# Patient Record
Sex: FEMALE | Race: WHITE | NOT HISPANIC OR LATINO | Employment: FULL TIME | URBAN - METROPOLITAN AREA
[De-identification: names, ages, dates, MRNs, and addresses within clinical notes are randomized per-mention and may not be internally consistent; named-entity substitution may affect disease eponyms.]

---

## 2017-03-09 ENCOUNTER — RECORDS - HEALTHEAST (OUTPATIENT)
Dept: GENERAL RADIOLOGY | Facility: CLINIC | Age: 62
End: 2017-03-09

## 2017-03-09 ENCOUNTER — OFFICE VISIT - HEALTHEAST (OUTPATIENT)
Dept: FAMILY MEDICINE | Facility: CLINIC | Age: 62
End: 2017-03-09

## 2017-03-09 DIAGNOSIS — J18.9 PNEUMONIA OF RIGHT LOWER LOBE DUE TO INFECTIOUS ORGANISM: ICD-10-CM

## 2017-03-09 DIAGNOSIS — R05.9 COUGH: ICD-10-CM

## 2017-03-09 DIAGNOSIS — R25.2 CRAMP OF BOTH LOWER EXTREMITIES: ICD-10-CM

## 2017-03-09 ASSESSMENT — MIFFLIN-ST. JEOR: SCORE: 1679.18

## 2017-06-22 ENCOUNTER — OFFICE VISIT - HEALTHEAST (OUTPATIENT)
Dept: FAMILY MEDICINE | Facility: CLINIC | Age: 62
End: 2017-06-22

## 2017-06-22 DIAGNOSIS — I65.23 CAROTID ARTERY PLAQUE, BILATERAL: ICD-10-CM

## 2017-06-22 DIAGNOSIS — E78.00 PURE HYPERCHOLESTEROLEMIA: ICD-10-CM

## 2017-06-29 ENCOUNTER — HOSPITAL ENCOUNTER (OUTPATIENT)
Dept: ULTRASOUND IMAGING | Facility: HOSPITAL | Age: 62
Discharge: HOME | End: 2017-06-29
Attending: FAMILY MEDICINE

## 2017-06-29 ENCOUNTER — COMMUNICATION - HEALTHEAST (OUTPATIENT)
Dept: FAMILY MEDICINE | Facility: CLINIC | Age: 62
End: 2017-06-29

## 2017-06-29 DIAGNOSIS — I65.23 CAROTID ARTERY PLAQUE, BILATERAL: ICD-10-CM

## 2017-08-17 ENCOUNTER — RECORDS - HEALTHEAST (OUTPATIENT)
Dept: ADMINISTRATIVE | Facility: OTHER | Age: 62
End: 2017-08-17

## 2017-10-05 ENCOUNTER — OFFICE VISIT - HEALTHEAST (OUTPATIENT)
Dept: FAMILY MEDICINE | Facility: CLINIC | Age: 62
End: 2017-10-05

## 2017-10-05 ENCOUNTER — AMBULATORY - HEALTHEAST (OUTPATIENT)
Dept: FAMILY MEDICINE | Facility: CLINIC | Age: 62
End: 2017-10-05

## 2017-10-05 DIAGNOSIS — J40 BRONCHITIS: ICD-10-CM

## 2017-10-05 RX ORDER — AMOXICILLIN 500 MG/1
TABLET, FILM COATED ORAL
Refills: 3 | Status: SHIPPED | COMMUNITY
Start: 2017-09-25 | End: 2021-07-29

## 2017-10-05 RX ORDER — ASCORBIC ACID 500 MG
500 TABLET ORAL
Status: SHIPPED | COMMUNITY
Start: 2010-06-28

## 2017-10-05 RX ORDER — ASPIRIN 325 MG
325 TABLET ORAL
Status: SHIPPED | COMMUNITY
Start: 2010-06-28 | End: 2021-07-29

## 2017-10-05 ASSESSMENT — MIFFLIN-ST. JEOR: SCORE: 1653.77

## 2017-10-14 ENCOUNTER — COMMUNICATION - HEALTHEAST (OUTPATIENT)
Dept: FAMILY MEDICINE | Facility: CLINIC | Age: 62
End: 2017-10-14

## 2017-10-24 ENCOUNTER — AMBULATORY - HEALTHEAST (OUTPATIENT)
Dept: FAMILY MEDICINE | Facility: CLINIC | Age: 62
End: 2017-10-24

## 2017-10-24 ENCOUNTER — COMMUNICATION - HEALTHEAST (OUTPATIENT)
Dept: FAMILY MEDICINE | Facility: CLINIC | Age: 62
End: 2017-10-24

## 2017-11-22 ENCOUNTER — COMMUNICATION - HEALTHEAST (OUTPATIENT)
Dept: FAMILY MEDICINE | Facility: CLINIC | Age: 62
End: 2017-11-22

## 2017-12-06 ENCOUNTER — COMMUNICATION - HEALTHEAST (OUTPATIENT)
Dept: FAMILY MEDICINE | Facility: CLINIC | Age: 62
End: 2017-12-06

## 2017-12-07 ENCOUNTER — AMBULATORY - HEALTHEAST (OUTPATIENT)
Dept: FAMILY MEDICINE | Facility: CLINIC | Age: 62
End: 2017-12-07

## 2018-06-14 ENCOUNTER — RECORDS - HEALTHEAST (OUTPATIENT)
Dept: GENERAL RADIOLOGY | Facility: CLINIC | Age: 63
End: 2018-06-14

## 2018-06-14 ENCOUNTER — AMBULATORY - HEALTHEAST (OUTPATIENT)
Dept: FAMILY MEDICINE | Facility: CLINIC | Age: 63
End: 2018-06-14

## 2018-06-14 ENCOUNTER — OFFICE VISIT - HEALTHEAST (OUTPATIENT)
Dept: FAMILY MEDICINE | Facility: CLINIC | Age: 63
End: 2018-06-14

## 2018-06-14 ENCOUNTER — COMMUNICATION - HEALTHEAST (OUTPATIENT)
Dept: FAMILY MEDICINE | Facility: CLINIC | Age: 63
End: 2018-06-14

## 2018-06-14 DIAGNOSIS — R05.3 CHRONIC COUGH: ICD-10-CM

## 2018-06-14 DIAGNOSIS — Z79.899 MEDICATION MANAGEMENT: ICD-10-CM

## 2018-06-14 DIAGNOSIS — G89.29 CHRONIC PAIN OF LEFT KNEE: ICD-10-CM

## 2018-06-14 DIAGNOSIS — L30.9 ECZEMA, UNSPECIFIED TYPE: ICD-10-CM

## 2018-06-14 DIAGNOSIS — R05.9 COUGH: ICD-10-CM

## 2018-06-14 DIAGNOSIS — E66.9 OBESITY: ICD-10-CM

## 2018-06-14 DIAGNOSIS — Z00.00 ROUTINE GENERAL MEDICAL EXAMINATION AT A HEALTH CARE FACILITY: ICD-10-CM

## 2018-06-14 DIAGNOSIS — M25.562 CHRONIC PAIN OF LEFT KNEE: ICD-10-CM

## 2018-06-14 DIAGNOSIS — E78.00 PURE HYPERCHOLESTEROLEMIA: ICD-10-CM

## 2018-06-14 LAB
ALBUMIN SERPL-MCNC: 4.2 G/DL (ref 3.5–5)
ALP SERPL-CCNC: 99 U/L (ref 45–120)
ALT SERPL W P-5'-P-CCNC: 23 U/L (ref 0–45)
ANION GAP SERPL CALCULATED.3IONS-SCNC: 10 MMOL/L (ref 5–18)
AST SERPL W P-5'-P-CCNC: 19 U/L (ref 0–40)
BILIRUB SERPL-MCNC: 0.7 MG/DL (ref 0–1)
BUN SERPL-MCNC: 29 MG/DL (ref 8–22)
CALCIUM SERPL-MCNC: 9.7 MG/DL (ref 8.5–10.5)
CHLORIDE BLD-SCNC: 107 MMOL/L (ref 98–107)
CHOLEST SERPL-MCNC: 160 MG/DL
CO2 SERPL-SCNC: 27 MMOL/L (ref 22–31)
CREAT SERPL-MCNC: 0.72 MG/DL (ref 0.6–1.1)
FASTING STATUS PATIENT QL REPORTED: YES
GFR SERPL CREATININE-BSD FRML MDRD: >60 ML/MIN/1.73M2
GLUCOSE BLD-MCNC: 89 MG/DL (ref 70–125)
HDLC SERPL-MCNC: 49 MG/DL
HGB BLD-MCNC: 14 G/DL (ref 12–16)
LDLC SERPL CALC-MCNC: 73 MG/DL
POTASSIUM BLD-SCNC: 4.1 MMOL/L (ref 3.5–5)
PROT SERPL-MCNC: 6.9 G/DL (ref 6–8)
SODIUM SERPL-SCNC: 144 MMOL/L (ref 136–145)
TRIGL SERPL-MCNC: 191 MG/DL

## 2018-06-14 ASSESSMENT — MIFFLIN-ST. JEOR: SCORE: 1676.34

## 2018-06-20 ENCOUNTER — COMMUNICATION - HEALTHEAST (OUTPATIENT)
Dept: FAMILY MEDICINE | Facility: CLINIC | Age: 63
End: 2018-06-20

## 2019-02-08 ENCOUNTER — COMMUNICATION - HEALTHEAST (OUTPATIENT)
Dept: FAMILY MEDICINE | Facility: CLINIC | Age: 64
End: 2019-02-08

## 2019-02-11 ENCOUNTER — COMMUNICATION - HEALTHEAST (OUTPATIENT)
Dept: FAMILY MEDICINE | Facility: CLINIC | Age: 64
End: 2019-02-11

## 2019-03-11 ENCOUNTER — COMMUNICATION - HEALTHEAST (OUTPATIENT)
Dept: FAMILY MEDICINE | Facility: CLINIC | Age: 64
End: 2019-03-11

## 2019-04-09 ENCOUNTER — COMMUNICATION - HEALTHEAST (OUTPATIENT)
Dept: FAMILY MEDICINE | Facility: CLINIC | Age: 64
End: 2019-04-09

## 2019-04-09 DIAGNOSIS — L30.9 ECZEMA, UNSPECIFIED TYPE: ICD-10-CM

## 2019-04-15 ENCOUNTER — COMMUNICATION - HEALTHEAST (OUTPATIENT)
Dept: FAMILY MEDICINE | Facility: CLINIC | Age: 64
End: 2019-04-15

## 2019-04-15 DIAGNOSIS — L30.9 ECZEMA, UNSPECIFIED TYPE: ICD-10-CM

## 2019-04-17 ENCOUNTER — COMMUNICATION - HEALTHEAST (OUTPATIENT)
Dept: FAMILY MEDICINE | Facility: CLINIC | Age: 64
End: 2019-04-17

## 2019-06-02 ENCOUNTER — COMMUNICATION - HEALTHEAST (OUTPATIENT)
Dept: FAMILY MEDICINE | Facility: CLINIC | Age: 64
End: 2019-06-02

## 2019-06-02 DIAGNOSIS — E78.00 PURE HYPERCHOLESTEROLEMIA: ICD-10-CM

## 2019-07-11 ENCOUNTER — OFFICE VISIT - HEALTHEAST (OUTPATIENT)
Dept: FAMILY MEDICINE | Facility: CLINIC | Age: 64
End: 2019-07-11

## 2019-07-11 DIAGNOSIS — Z79.899 MEDICATION MANAGEMENT: ICD-10-CM

## 2019-07-11 DIAGNOSIS — E78.00 PURE HYPERCHOLESTEROLEMIA: ICD-10-CM

## 2019-07-11 DIAGNOSIS — L30.9 ECZEMA, UNSPECIFIED TYPE: ICD-10-CM

## 2019-07-11 DIAGNOSIS — E66.01 MORBID OBESITY (H): ICD-10-CM

## 2019-07-11 DIAGNOSIS — Z12.11 SCREEN FOR COLON CANCER: ICD-10-CM

## 2019-07-11 DIAGNOSIS — Z00.00 HEALTH CARE MAINTENANCE: ICD-10-CM

## 2019-07-11 LAB
ALBUMIN SERPL-MCNC: 4.2 G/DL (ref 3.5–5)
ALP SERPL-CCNC: 96 U/L (ref 45–120)
ALT SERPL W P-5'-P-CCNC: 18 U/L (ref 0–45)
ANION GAP SERPL CALCULATED.3IONS-SCNC: 7 MMOL/L (ref 5–18)
AST SERPL W P-5'-P-CCNC: 16 U/L (ref 0–40)
BILIRUB SERPL-MCNC: 0.6 MG/DL (ref 0–1)
BUN SERPL-MCNC: 17 MG/DL (ref 8–22)
CALCIUM SERPL-MCNC: 10 MG/DL (ref 8.5–10.5)
CHLORIDE BLD-SCNC: 105 MMOL/L (ref 98–107)
CHOLEST SERPL-MCNC: 166 MG/DL
CO2 SERPL-SCNC: 30 MMOL/L (ref 22–31)
CREAT SERPL-MCNC: 0.72 MG/DL (ref 0.6–1.1)
FASTING STATUS PATIENT QL REPORTED: YES
GFR SERPL CREATININE-BSD FRML MDRD: >60 ML/MIN/1.73M2
GLUCOSE BLD-MCNC: 88 MG/DL (ref 70–125)
HDLC SERPL-MCNC: 52 MG/DL
LDLC SERPL CALC-MCNC: 75 MG/DL
POTASSIUM BLD-SCNC: 4.2 MMOL/L (ref 3.5–5)
PROT SERPL-MCNC: 6.9 G/DL (ref 6–8)
SODIUM SERPL-SCNC: 142 MMOL/L (ref 136–145)
TRIGL SERPL-MCNC: 195 MG/DL

## 2019-07-11 ASSESSMENT — MIFFLIN-ST. JEOR: SCORE: 1657.74

## 2019-07-14 ENCOUNTER — COMMUNICATION - HEALTHEAST (OUTPATIENT)
Dept: FAMILY MEDICINE | Facility: CLINIC | Age: 64
End: 2019-07-14

## 2019-07-14 DIAGNOSIS — E78.00 PURE HYPERCHOLESTEROLEMIA: ICD-10-CM

## 2019-07-15 ENCOUNTER — COMMUNICATION - HEALTHEAST (OUTPATIENT)
Dept: FAMILY MEDICINE | Facility: CLINIC | Age: 64
End: 2019-07-15

## 2019-09-05 ENCOUNTER — COMMUNICATION - HEALTHEAST (OUTPATIENT)
Dept: FAMILY MEDICINE | Facility: CLINIC | Age: 64
End: 2019-09-05

## 2019-12-20 ENCOUNTER — COMMUNICATION - HEALTHEAST (OUTPATIENT)
Dept: FAMILY MEDICINE | Facility: CLINIC | Age: 64
End: 2019-12-20

## 2019-12-20 DIAGNOSIS — E78.00 PURE HYPERCHOLESTEROLEMIA: ICD-10-CM

## 2020-02-04 ENCOUNTER — OFFICE VISIT - HEALTHEAST (OUTPATIENT)
Dept: FAMILY MEDICINE | Facility: CLINIC | Age: 65
End: 2020-02-04

## 2020-02-04 ENCOUNTER — COMMUNICATION - HEALTHEAST (OUTPATIENT)
Dept: FAMILY MEDICINE | Facility: CLINIC | Age: 65
End: 2020-02-04

## 2020-02-04 ENCOUNTER — COMMUNICATION - HEALTHEAST (OUTPATIENT)
Dept: SCHEDULING | Facility: CLINIC | Age: 65
End: 2020-02-04

## 2020-02-04 DIAGNOSIS — R05.9 COUGH: ICD-10-CM

## 2020-02-04 DIAGNOSIS — E78.00 PURE HYPERCHOLESTEROLEMIA: ICD-10-CM

## 2020-02-04 ASSESSMENT — MIFFLIN-ST. JEOR: SCORE: 1674.58

## 2020-02-07 ENCOUNTER — COMMUNICATION - HEALTHEAST (OUTPATIENT)
Dept: FAMILY MEDICINE | Facility: CLINIC | Age: 65
End: 2020-02-07

## 2020-10-06 ENCOUNTER — OFFICE VISIT - HEALTHEAST (OUTPATIENT)
Dept: FAMILY MEDICINE | Facility: CLINIC | Age: 65
End: 2020-10-06

## 2020-10-06 ENCOUNTER — COMMUNICATION - HEALTHEAST (OUTPATIENT)
Dept: FAMILY MEDICINE | Facility: CLINIC | Age: 65
End: 2020-10-06

## 2020-10-06 DIAGNOSIS — R03.0 ELEVATED BLOOD PRESSURE READING: ICD-10-CM

## 2020-10-06 DIAGNOSIS — R25.2 CRAMP OF BOTH LOWER EXTREMITIES: ICD-10-CM

## 2020-10-06 DIAGNOSIS — R60.9 EDEMA, UNSPECIFIED TYPE: ICD-10-CM

## 2020-10-06 DIAGNOSIS — Z79.899 MEDICATION MANAGEMENT: ICD-10-CM

## 2020-10-06 DIAGNOSIS — E66.01 MORBID OBESITY (H): ICD-10-CM

## 2020-10-06 DIAGNOSIS — L30.9 ECZEMA, UNSPECIFIED TYPE: ICD-10-CM

## 2020-10-06 DIAGNOSIS — Z00.00 HEALTH CARE MAINTENANCE: ICD-10-CM

## 2020-10-06 DIAGNOSIS — E78.2 MIXED HYPERLIPIDEMIA: ICD-10-CM

## 2020-10-06 DIAGNOSIS — Z12.11 COLON CANCER SCREENING: ICD-10-CM

## 2020-10-06 LAB
ALBUMIN SERPL-MCNC: 4.3 G/DL (ref 3.5–5)
ALP SERPL-CCNC: 94 U/L (ref 45–120)
ALT SERPL W P-5'-P-CCNC: 17 U/L (ref 0–45)
ANION GAP SERPL CALCULATED.3IONS-SCNC: 8 MMOL/L (ref 5–18)
AST SERPL W P-5'-P-CCNC: 16 U/L (ref 0–40)
BILIRUB SERPL-MCNC: 0.5 MG/DL (ref 0–1)
BUN SERPL-MCNC: 21 MG/DL (ref 8–22)
CALCIUM SERPL-MCNC: 9.2 MG/DL (ref 8.5–10.5)
CHLORIDE BLD-SCNC: 105 MMOL/L (ref 98–107)
CHOLEST SERPL-MCNC: 162 MG/DL
CO2 SERPL-SCNC: 29 MMOL/L (ref 22–31)
CREAT SERPL-MCNC: 0.76 MG/DL (ref 0.6–1.1)
FASTING STATUS PATIENT QL REPORTED: YES
GFR SERPL CREATININE-BSD FRML MDRD: >60 ML/MIN/1.73M2
GLUCOSE BLD-MCNC: 89 MG/DL (ref 70–125)
HDLC SERPL-MCNC: 48 MG/DL
LDLC SERPL CALC-MCNC: 80 MG/DL
POTASSIUM BLD-SCNC: 4.2 MMOL/L (ref 3.5–5)
PROT SERPL-MCNC: 6.9 G/DL (ref 6–8)
SODIUM SERPL-SCNC: 142 MMOL/L (ref 136–145)
TRIGL SERPL-MCNC: 169 MG/DL

## 2020-10-06 RX ORDER — TRIAMCINOLONE ACETONIDE 1 MG/G
CREAM TOPICAL
Qty: 240 G | Refills: 3 | Status: SHIPPED | OUTPATIENT
Start: 2020-10-06

## 2020-10-06 RX ORDER — ATORVASTATIN CALCIUM 20 MG/1
20 TABLET, FILM COATED ORAL AT BEDTIME
Qty: 90 TABLET | Refills: 2 | Status: SHIPPED | OUTPATIENT
Start: 2020-10-06 | End: 2021-07-29

## 2020-10-06 RX ORDER — TRAMADOL HYDROCHLORIDE 50 MG/1
50-100 TABLET ORAL EVERY 6 HOURS PRN
Qty: 20 TABLET | Refills: 0 | Status: SHIPPED | OUTPATIENT
Start: 2020-10-06 | End: 2022-10-27

## 2020-11-17 ENCOUNTER — COMMUNICATION - HEALTHEAST (OUTPATIENT)
Dept: FAMILY MEDICINE | Facility: CLINIC | Age: 65
End: 2020-11-17

## 2020-11-17 DIAGNOSIS — R60.9 EDEMA, UNSPECIFIED TYPE: ICD-10-CM

## 2020-11-18 ENCOUNTER — AMBULATORY - HEALTHEAST (OUTPATIENT)
Dept: FAMILY MEDICINE | Facility: CLINIC | Age: 65
End: 2020-11-18

## 2020-11-18 DIAGNOSIS — R25.2 CRAMP OF BOTH LOWER EXTREMITIES: ICD-10-CM

## 2020-12-03 ENCOUNTER — COMMUNICATION - HEALTHEAST (OUTPATIENT)
Dept: LAB | Facility: CLINIC | Age: 65
End: 2020-12-03

## 2020-12-04 ENCOUNTER — AMBULATORY - HEALTHEAST (OUTPATIENT)
Dept: FAMILY MEDICINE | Facility: CLINIC | Age: 65
End: 2020-12-04

## 2020-12-04 ENCOUNTER — AMBULATORY - HEALTHEAST (OUTPATIENT)
Dept: LAB | Facility: CLINIC | Age: 65
End: 2020-12-04

## 2020-12-04 ENCOUNTER — COMMUNICATION - HEALTHEAST (OUTPATIENT)
Dept: FAMILY MEDICINE | Facility: CLINIC | Age: 65
End: 2020-12-04

## 2020-12-04 ENCOUNTER — AMBULATORY - HEALTHEAST (OUTPATIENT)
Dept: NURSING | Facility: CLINIC | Age: 65
End: 2020-12-04

## 2020-12-04 DIAGNOSIS — Z79.899 MEDICATION MANAGEMENT: ICD-10-CM

## 2020-12-04 DIAGNOSIS — R03.0 ELEVATED BLOOD PRESSURE READING WITHOUT DIAGNOSIS OF HYPERTENSION: ICD-10-CM

## 2020-12-04 LAB
ANION GAP SERPL CALCULATED.3IONS-SCNC: 11 MMOL/L (ref 5–18)
BUN SERPL-MCNC: 21 MG/DL (ref 8–22)
CALCIUM SERPL-MCNC: 9.5 MG/DL (ref 8.5–10.5)
CHLORIDE BLD-SCNC: 102 MMOL/L (ref 98–107)
CO2 SERPL-SCNC: 28 MMOL/L (ref 22–31)
CREAT SERPL-MCNC: 0.72 MG/DL (ref 0.6–1.1)
GFR SERPL CREATININE-BSD FRML MDRD: >60 ML/MIN/1.73M2
GLUCOSE BLD-MCNC: 93 MG/DL (ref 70–125)
POTASSIUM BLD-SCNC: 4 MMOL/L (ref 3.5–5)
SODIUM SERPL-SCNC: 141 MMOL/L (ref 136–145)

## 2020-12-17 ENCOUNTER — AMBULATORY - HEALTHEAST (OUTPATIENT)
Dept: NURSING | Facility: CLINIC | Age: 65
End: 2020-12-17

## 2021-02-08 ENCOUNTER — COMMUNICATION - HEALTHEAST (OUTPATIENT)
Dept: FAMILY MEDICINE | Facility: CLINIC | Age: 66
End: 2021-02-08

## 2021-02-08 DIAGNOSIS — R25.2 CRAMP OF BOTH LOWER EXTREMITIES: ICD-10-CM

## 2021-02-08 RX ORDER — HYDROXYZINE HYDROCHLORIDE 25 MG/1
TABLET, FILM COATED ORAL
Qty: 270 TABLET | Refills: 2 | Status: SHIPPED | OUTPATIENT
Start: 2021-02-08 | End: 2021-10-18

## 2021-05-03 ENCOUNTER — COMMUNICATION - HEALTHEAST (OUTPATIENT)
Dept: FAMILY MEDICINE | Facility: CLINIC | Age: 66
End: 2021-05-03

## 2021-05-03 ENCOUNTER — COMMUNICATION - HEALTHEAST (OUTPATIENT)
Dept: SCHEDULING | Facility: CLINIC | Age: 66
End: 2021-05-03

## 2021-05-03 DIAGNOSIS — R60.9 EDEMA, UNSPECIFIED TYPE: ICD-10-CM

## 2021-05-03 RX ORDER — HYDROCHLOROTHIAZIDE 12.5 MG/1
12.5 TABLET ORAL DAILY
Qty: 90 TABLET | Refills: 1 | Status: SHIPPED | OUTPATIENT
Start: 2021-05-03 | End: 2021-07-29

## 2021-05-26 ENCOUNTER — RECORDS - HEALTHEAST (OUTPATIENT)
Dept: ADMINISTRATIVE | Facility: CLINIC | Age: 66
End: 2021-05-26

## 2021-05-27 ENCOUNTER — RECORDS - HEALTHEAST (OUTPATIENT)
Dept: ADMINISTRATIVE | Facility: CLINIC | Age: 66
End: 2021-05-27

## 2021-05-27 VITALS — DIASTOLIC BLOOD PRESSURE: 80 MMHG | SYSTOLIC BLOOD PRESSURE: 130 MMHG

## 2021-05-27 VITALS — DIASTOLIC BLOOD PRESSURE: 90 MMHG | SYSTOLIC BLOOD PRESSURE: 146 MMHG

## 2021-05-27 NOTE — TELEPHONE ENCOUNTER
Reason contacted:  Medication problem  Information relayed:  Called [facility] who indicated 3 tubes of triamcinolone ointment has been shipped to the patient already.   Additional questions:  No  Further follow-up needed:  No  Okay to leave a detailed message:  No

## 2021-05-27 NOTE — TELEPHONE ENCOUNTER
Medication Question or Clarification  Who is calling: Pharmacy: [facility] pharmacy technician  What medication are you calling about? (include dose and sig)    Disp Refills Start End    triamcinolone (KENALOG) 0.1 % ointment 30 g 0 4/9/2019     Sig: Apply  topically to affected area(s) 3 times daily prn    Sent to pharmacy as: triamcinolone (KENALOG) 0.1 % ointment    E-Prescribing Status: Receipt confirmed by pharmacy (4/9/2019  3:12 PM CDT)        Who prescribed the medication?: Celia Falcon CNP   What is your question/concern?: Caller stated patient would like a 3 month supply and the tube they are have 80 g each. Caller stated they need a new Rx sent over.  Pharmacy: [facility] order  Okay to leave a detailed message?: No  [phone], reference # [id number]  Site CMT - Please call the pharmacy to obtain any additional needed information.

## 2021-05-27 NOTE — TELEPHONE ENCOUNTER
Medication Question or Clarification  Who is calling: Pharmacy: [facility]   What medication are you calling about? (include dose and sig) Triamcinolone 0.1% ointment   Who prescribed the medication?: Dr. Falcon  What is your question/concern?: prescribed only 1 tube and patient was asking to have 3 tubes prescribed.  Pharmacy: [facility] pharmacy- mail order pharmacy  Okay to leave a detailed message?: Yes  Site CMT - Please call the pharmacy to obtain any additional needed information.    Order # [id number]

## 2021-05-27 NOTE — TELEPHONE ENCOUNTER
RN cannot approve Refill Request    RN can NOT refill this medication med is not covered by policy/route to provider.    Tobi Price, [facility] Triage/Med Refill 4/9/2019    Requested Prescriptions   Pending Prescriptions Disp Refills     triamcinolone (KENALOG) 0.1 % ointment 30 g 0     Sig: Apply  topically to affected area(s) 3 times daily prn       There is no refill protocol information for this order

## 2021-05-29 NOTE — TELEPHONE ENCOUNTER
Due to be seen    Rx renewed per Medication Renewal Policy. Medication was last renewed on 6/14/18.    Iveth Figueroa, [facility] Triage/Med Refill 6/3/2019     Requested Prescriptions   Pending Prescriptions Disp Refills     atorvastatin (LIPITOR) 10 MG tablet [Pharmacy Med Name: ATORVASTATIN TAB 10MG] 90 tablet 3     Sig: TAKE 1 TABLET AT BEDTIME       Statins Refill Protocol (Hmg CoA Reductase Inhibitors) Passed - 6/2/2019  9:19 AM        Passed - PCP or prescribing provider visit in past 12 months      Last office visit with prescriber/PCP: 3/9/2017 Celia Falcon CNP OR same dept: Visit date not found OR same specialty: 10/5/2017 Yamila Amaral DO  Last physical: 6/14/2018 Last MTM visit: Visit date not found   Next visit within 3 mo: Visit date not found  Next physical within 3 mo: Visit date not found  Prescriber OR PCP: Celia Falcon CNP  Last diagnosis associated with med order: 1. Essential Hypercholesterolemia  - atorvastatin (LIPITOR) 10 MG tablet [Pharmacy Med Name: ATORVASTATIN TAB 10MG]; TAKE 1 TABLET AT BEDTIME  Dispense: 90 tablet; Refill: 3    If protocol passes may refill for 12 months if within 3 months of last provider visit (or a total of 15 months).

## 2021-05-29 NOTE — TELEPHONE ENCOUNTER
Left message to call back for: pt  Information to relay to patient:  Left message to call and schedule physical with Adebayo

## 2021-05-29 NOTE — TELEPHONE ENCOUNTER
Left message to call back for: pt  Information to relay to patient:  2nd message to call and schedule physical with Adebayo

## 2021-05-30 ENCOUNTER — RECORDS - HEALTHEAST (OUTPATIENT)
Dept: ADMINISTRATIVE | Facility: CLINIC | Age: 66
End: 2021-05-30

## 2021-05-30 VITALS — WEIGHT: 238.7 LBS | HEIGHT: 69 IN | BODY MASS INDEX: 35.35 KG/M2

## 2021-05-30 NOTE — TELEPHONE ENCOUNTER
----- Message from Celia Falcon CNP sent at 7/14/2019  7:13 PM CDT -----  Please notify the patient that her labs are normal, but her triglycerides are elevated.  I increased her Atorvastatin to 20 mg daily.  Limiting alcohol use will also improve her triglycerides.  I recommend a lab recheck in 3 months..  Thanks.

## 2021-05-30 NOTE — PROGRESS NOTES
Assessment and Plan:     1. Essential Hypercholesterolemia  We will check lipid cascade and adjust atorvastatin accordingly.  Discussed low-fat diet.  - Lipid Cascade  - atorvastatin (LIPITOR) 10 MG tablet; Take 1 tablet (10 mg total) by mouth at bedtime.  Dispense: 90 tablet; Refill: 2    2. Osteoarthritis Of The Knee  Patient continues tramadol as needed.  She is only used 4 tablets since 2017.    3. Eczema, unspecified type  She continues triamcinolone cream as needed.    4. Medication management  - Comprehensive Metabolic Panel    5. Screen for colon cancer  - Ambulatory referral for Colonoscopy    6. Morbid obesity (H)  The following high BMI interventions were performed this visit: encouragement to exercise and lifestyle education regarding diet    7. Health care maintenance  - Varicella Zoster, Recombinant Vaccine IM    Patient states she has a physical scheduled with her OB/GYN in July.  She has a mammogram scheduled then.  I encouraged her to follow-up if her blood pressure remains elevated.  She is content with the plan.    Subjective:     Akanksha is a 64 y.o. female presenting to the clinic for medication management.  Patient has hyperlipidemia and is taking atorvastatin 10 mg daily.  She is consuming a healthy diet and walks 5 miles per day.  She denies chest pain, shortness of breath with exertion, edema, orthopnea, syncope.  She has osteoarthritis of both knees and has had 2 knee replacements within the left knee and one knee replacement within the right knee.  She sees Dr. Webster who started prescribing tramadol.  He did not want to prescribe this long-term, so she was referred to primary care.  She was prescribed 40 tablets in 2017 and has only needed 4 tablets.  She takes the tablets when she experiences severe leg cramps.  She says that orthopedics does not understand why she is getting the leg cramps.  Lastly, patient uses triamcinolone cream as needed for eczema which flares during the winter.   Patient's blood pressure is mildly elevated today.  She does not monitor her blood pressure at home.    Review of Systems: A complete 14 point review of systems was obtained and is negative or as stated in the history of present illness.    Social History     Socioeconomic History     Marital status: Single     Spouse name: Not on file     Number of children: Not on file     Years of education: Not on file     Highest education level: Not on file   Occupational History     Not on file   Social Needs     Financial resource strain: Not on file     Food insecurity:     Worry: Not on file     Inability: Not on file     Transportation needs:     Medical: Not on file     Non-medical: Not on file   Tobacco Use     Smoking status: Never Smoker     Smokeless tobacco: Never Used   Substance and Sexual Activity     Alcohol use: Yes     Comment: rare     Drug use: No     Sexual activity: Not on file     Comment: single    Lifestyle     Physical activity:     Days per week: Not on file     Minutes per session: Not on file     Stress: Not on file   Relationships     Social connections:     Talks on phone: Not on file     Gets together: Not on file     Attends Moravian service: Not on file     Active member of club or organization: Not on file     Attends meetings of clubs or organizations: Not on file     Relationship status: Not on file     Intimate partner violence:     Fear of current or ex partner: Not on file     Emotionally abused: Not on file     Physically abused: Not on file     Forced sexual activity: Not on file   Other Topics Concern     Not on file   Social History Narrative     Not on file       Active Ambulatory Problems     Diagnosis Date Noted     Diverticulosis      Fatty Liver      Obesity      Osteoarthritis Of The Knee      Essential Hypercholesterolemia      Sinus Bradycardia      Hyperlipidemia 08/06/2012     Menopause 08/17/2017     Obesity (BMI 35.0-39.9) with comorbidity (H) 07/11/2019     Resolved  "Ambulatory Problems     Diagnosis Date Noted     Acute pharyngitis      Midback Pain      Cramp of limb      Tingling (Paresthesia)      Shortness of breath      No Additional Past Medical History       Family History   Problem Relation Age of Onset     Heart disease Father      Hypertension Father      Diabetes Brother      Hypertension Brother      Hypertension Brother        Objective:     /80   Pulse (!) 50   Ht 5' 7.75\" (1.721 m)   Wt (!) 236 lb 9.6 oz (107.3 kg)   SpO2 96%   BMI 36.24 kg/m      Patient is alert, in no obvious distress.   Skin: Warm, dry.   Neck: Supple, no lymphadenopathy. No thyromegaly.  Lungs:  Clear to auscultation. Respirations even and unlabored.  No wheezing or rales noted.   Heart:  Regular rate and rhythm.  No murmurs, S3, S4, gallops, or rubs.    Abdomen: Soft, nontender.  No organomegaly. Bowel sounds normoactive. No guarding or masses noted.   Musculoskeletal:  She has large scars present on both knees.  No edema present in bilateral lower extremities.               "

## 2021-05-30 NOTE — TELEPHONE ENCOUNTER
Patient Returning Call  Reason for call:  LMTCB   Information relayed to patient: Relayed result note and patient agrees. Patient will follow up as needed . No further questions regarding this matter.   Patient has additional questions:  No   If YES, what are your questions/concerns:    NA   Okay to leave a detailed message?: No call back needed

## 2021-05-31 ENCOUNTER — RECORDS - HEALTHEAST (OUTPATIENT)
Dept: ADMINISTRATIVE | Facility: CLINIC | Age: 66
End: 2021-05-31

## 2021-05-31 VITALS — BODY MASS INDEX: 34.91 KG/M2 | WEIGHT: 233 LBS

## 2021-05-31 VITALS — WEIGHT: 233.1 LBS | HEIGHT: 69 IN | BODY MASS INDEX: 34.52 KG/M2

## 2021-06-01 ENCOUNTER — RECORDS - HEALTHEAST (OUTPATIENT)
Dept: ADMINISTRATIVE | Facility: CLINIC | Age: 66
End: 2021-06-01

## 2021-06-01 VITALS — BODY MASS INDEX: 36.48 KG/M2 | WEIGHT: 240.7 LBS | HEIGHT: 68 IN

## 2021-06-03 ENCOUNTER — RECORDS - HEALTHEAST (OUTPATIENT)
Dept: ADMINISTRATIVE | Facility: CLINIC | Age: 66
End: 2021-06-03

## 2021-06-03 VITALS — HEIGHT: 68 IN | WEIGHT: 236.6 LBS | BODY MASS INDEX: 35.86 KG/M2

## 2021-06-04 VITALS
WEIGHT: 239.44 LBS | OXYGEN SATURATION: 95 % | BODY MASS INDEX: 36.29 KG/M2 | TEMPERATURE: 98.6 F | HEART RATE: 54 BPM | SYSTOLIC BLOOD PRESSURE: 150 MMHG | DIASTOLIC BLOOD PRESSURE: 90 MMHG | HEIGHT: 68 IN

## 2021-06-04 NOTE — TELEPHONE ENCOUNTER
Refill Approved    Rx renewed per Medication Renewal Policy. Medication was last renewed on 7/14/2019  .    Raul Borden, [facility] [place] Connection Triage/Med Refill 12/23/2019     Requested Prescriptions   Pending Prescriptions Disp Refills     atorvastatin (LIPITOR) 20 MG tablet [Pharmacy Med Name: ATORVASTATIN TAB 20MG] 90 tablet 0     Sig: TAKE 1 TABLET AT BEDTIME       Statins Refill Protocol (Hmg CoA Reductase Inhibitors) Passed - 12/20/2019  4:38 PM        Passed - PCP or prescribing provider visit in past 12 months      Last office visit with prescriber/PCP: 7/11/2019 Celia Falcon CNP OR same dept: 7/11/2019 Celia Falcon CNP OR same specialty: 7/11/2019 Celia Falcon CNP  Last physical: 6/14/2018 Last MTM visit: Visit date not found   Next visit within 3 mo: Visit date not found  Next physical within 3 mo: Visit date not found  Prescriber OR PCP: Celia Falcon CNP  Last diagnosis associated with med order: 1. Essential Hypercholesterolemia  - atorvastatin (LIPITOR) 20 MG tablet [Pharmacy Med Name: ATORVASTATIN TAB 20MG]; TAKE 1 TABLET AT BEDTIME  Dispense: 90 tablet; Refill: 0    If protocol passes may refill for 12 months if within 3 months of last provider visit (or a total of 15 months).

## 2021-06-05 VITALS
DIASTOLIC BLOOD PRESSURE: 72 MMHG | BODY MASS INDEX: 35.75 KG/M2 | HEART RATE: 60 BPM | WEIGHT: 235.1 LBS | OXYGEN SATURATION: 96 % | SYSTOLIC BLOOD PRESSURE: 144 MMHG

## 2021-06-05 NOTE — TELEPHONE ENCOUNTER
Reason contacted:  Test results within this encounter   Information relayed:  Dr Arcos notes below   Additional questions:  No  Further follow-up needed:  No  Okay to leave a detailed message:  No

## 2021-06-05 NOTE — TELEPHONE ENCOUNTER
Pt is only long-term through the antibiotic course and may not be experiencing significant improvement at this time. I recommend continuing full doxycycline course and then if not improving would recommend re-evaluation and potential change of antibiotics.    Appears pt wishes to have PCP review this as well.    Dr Conner

## 2021-06-05 NOTE — TELEPHONE ENCOUNTER
"I called the patient.  She is having worsening cough and is having difficulty sleeping at night. She has \"two days left of the Doxycycline\".  She has a history of this same cough and no antibiotics have worked for her in the past.  She requests for me to \"get rid of her cough\".  I explained that I have not evaluated her and am uncomfortable prescribing over the phone.  I recommend she see UC for further evaluation and explained that if her symptoms are worsening, she may need a CT scan, blood work, or referral to pulmonology.  She was unhappy when she hung up the phone.   "

## 2021-06-05 NOTE — TELEPHONE ENCOUNTER
Celia can you review?   I sent this to Dr. Conner since she evaluated patient in clinic but she is unhappy with her recommendation.       See below message

## 2021-06-05 NOTE — TELEPHONE ENCOUNTER
Patient is requesting a zpack to use in place of doxycycline and a strong cough suppressant  -see below message     Per 2/4/2020 OV note:   1. Cough  Pt with cough for 2 months duration, feels like it is worsening, hx recurrent pneumonias. Overall exam normal and vitals wnl. Recommend CXR for further evaluation - per my read no clear lobar pneumonia but some increased interstitial markings suggestive of potential early pneumonia - awaiting formal radiology read. Recommend treatment with the following: start doxycycline for potential pneumonia, start tessalon perles, adequate hydration, robitussin, cough drops, mucinex. Follow up as needed.  - XR Chest 2 Views  - doxycycline (MONODOX) 100 MG capsule; Take 1 capsule (100 mg total) by mouth 2 (two) times a day for 7 days.  Dispense: 14 capsule; Refill: 0  - benzonatate (TESSALON PERLES) 100 MG capsule; Take 1 capsule (100 mg total) by mouth 3 (three) times a day as needed for cough.  Dispense: 30 capsule; Refill: 0     2. Essential Hypercholesterolemia  Refill of atorvastatin requested and provided today. Pt asking if labs can be done but discussed too early for lipid panel.   - atorvastatin (LIPITOR) 20 MG tablet; Take 1 tablet (20 mg total) by mouth at bedtime.  Dispense: 90 tablet; Refill: 1

## 2021-06-05 NOTE — TELEPHONE ENCOUNTER
Patient Returning Call  Reason for call:  Return call  Information relayed to patient:  Patient was informed of the message below.   Patient has additional questions:  Yes  If YES, what are your questions/concerns:  Patient stated that she is not very happy with the services that she is getting now. Patient stated that she is only getting worst with this medication and would like a new prescription that's all. Patient stated that if it's that difficult for Celia Falcon CNP to return her call then she will have to go someone else. Patient stated that if she goes somewhere else and have to repeat everything over again she will not be happy with the clinic. Patient would like a call back to discuss. Patient declined nurse triage.  Okay to leave a detailed message?: Yes  [phone]

## 2021-06-05 NOTE — TELEPHONE ENCOUNTER
----- Message from Negar Maciel CMA sent at 2/4/2020  2:57 PM CST -----    ----- Message -----  From: Babs Conner MD  Sent: 2/4/2020   2:25 PM CST  To: Babs Conner Care Team Pool    Please call pt to let her know that the radiologist reviewed her xray and did not see any pneumonia. She will continue managing her cough as we discussed in clinic and follow up as needed.    Thanks  Dr Conner

## 2021-06-05 NOTE — TELEPHONE ENCOUNTER
Triage call:     Cough for 2 months- reports one week ago she started to have chest congestion- ear ache (left), sore throat     Fever- didn't take but states that she has the chills and can tell she is warm     Triaged to be seen in the office today or tomorrow- reviewed additional care advice with patient and she verbalizes understanding. Patient warm transferred to scheduling for appointment.   Appointment scheduled at 11:20 am with Dr Conner today    Karla Medeiros RN BSBA [facility] Triage/Med Refill 2/4/2020 7:20 AM      Reason for Disposition    All other earaches (Exceptions: earache lasting < 1 hour, and earache from air travel)    Protocols used: EARACHE-A-OH

## 2021-06-05 NOTE — TELEPHONE ENCOUNTER
Who is calling:  Patient   Reason for Call:  Caller is checking on the status. Caller stated that she does not want to go the urgent care for another visit for her cough.   Date of last appointment with primary care: n/a  Okay to leave a detailed message: Yes

## 2021-06-05 NOTE — PATIENT INSTRUCTIONS - HE
Start doxycycline antibiotic for potential pneumonia - will call with radiologist report on the xray done today  Start tessalon perles for cough suppression    Keep drinking lots of water  Ok to use robitussin/delsym, cough drops (cepacol lozenge has some numbing if the throat is really sore; menthol can be really helpful; try to use sugar free)  Could try mucinex

## 2021-06-05 NOTE — PROGRESS NOTES
Assessment/Plan:    1. Cough  Pt with cough for 2 months duration, feels like it is worsening, hx recurrent pneumonias. Overall exam normal and vitals wnl. Recommend CXR for further evaluation - per my read no clear lobar pneumonia but some increased interstitial markings suggestive of potential early pneumonia - awaiting formal radiology read. Recommend treatment with the following: start doxycycline for potential pneumonia, start tessalon perles, adequate hydration, robitussin, cough drops, mucinex. Follow up as needed.  - XR Chest 2 Views  - doxycycline (MONODOX) 100 MG capsule; Take 1 capsule (100 mg total) by mouth 2 (two) times a day for 7 days.  Dispense: 14 capsule; Refill: 0  - benzonatate (TESSALON PERLES) 100 MG capsule; Take 1 capsule (100 mg total) by mouth 3 (three) times a day as needed for cough.  Dispense: 30 capsule; Refill: 0    2. Essential Hypercholesterolemia  Refill of atorvastatin requested and provided today. Pt asking if labs can be done but discussed too early for lipid panel.   - atorvastatin (LIPITOR) 20 MG tablet; Take 1 tablet (20 mg total) by mouth at bedtime.  Dispense: 90 tablet; Refill: 1      Follow up: as needed    Babs Conner MD  [facility]    Subjective:    Patient ID: Akanksha Cohn is a 64 y.o. female is here today for cough    Cough  -started 2 months ago or so - initially cough but has been getting progressively worse  -last Thursday felt like cough went into her chest/lungs - increased pain with coughing  -went to work yesterday - also feeling chilled, L ear pain, sore throat  -in 2018 had similar cough - XR showed pneumonia - treated with doxycycline which helped per pt  -hx pneumonias  -hasn't checked temperature  -reports trying some OTC meds but nothing seemed to help  -no significant sinus issues or rhinorrhea/congestion  -no close sick contacts    Pt also requesting refill of pain medication - will send to PCP to review and send in if  appropriate      Patient Active Problem List   Diagnosis     Diverticulosis     Fatty Liver     Obesity     Osteoarthritis Of The Knee     Essential Hypercholesterolemia     Sinus Bradycardia     Hyperlipidemia     Menopause     Obesity (BMI 35.0-39.9) with comorbidity (H)       Past Surgical History:   Procedure Laterality Date     CERVICAL BIOPSY  W/ LOOP ELECTRODE EXCISION       HYSTERECTOMY       TOTAL ABDOMINAL HYSTERECTOMY       TOTAL KNEE ARTHROPLASTY Bilateral      Current Outpatient Medications on File Prior to Visit   Medication Sig Dispense Refill     ascorbic acid, vitamin C, (VITAMIN C) 500 MG tablet Take 500 mg by mouth.       aspirin 325 MG tablet Take 325 mg by mouth.       aspirin-acetaminophen-caffeine (EXCEDRIN MIGRAINE) 250-250-65 mg per tablet Take 2 tablets by mouth daily.       calcium carbonate-vitamin D3 600 mg calcium- 200 unit cap Take 1 tablet by mouth daily.       PAMABROM (DIUREX MAX ORAL) Take 1 tablet by mouth 2 (two) times a day.       triamcinolone (KENALOG) 0.1 % cream Apply to the affected area twice daily as needed.  No longer than 2 weeks in duration. 240 g 3     amoxicillin (AMOXIL) 500 MG tablet DNT  3     traMADol (ULTRAM) 50 mg tablet Take 1-2 tablets ( mg total) by mouth every 6 (six) hours as needed for pain. 40 tablet 0     No current facility-administered medications on file prior to visit.      No Known Allergies  Social History     Socioeconomic History     Marital status: Single     Spouse name: Not on file     Number of children: Not on file     Years of education: Not on file     Highest education level: Not on file   Occupational History     Not on file   Social Needs     Financial resource strain: Not on file     Food insecurity:     Worry: Not on file     Inability: Not on file     Transportation needs:     Medical: Not on file     Non-medical: Not on file   Tobacco Use     Smoking status: Never Smoker     Smokeless tobacco: Never Used   Substance and  "Sexual Activity     Alcohol use: Yes     Comment: rare     Drug use: No     Sexual activity: Not on file     Comment: single    Lifestyle     Physical activity:     Days per week: Not on file     Minutes per session: Not on file     Stress: Not on file   Relationships     Social connections:     Talks on phone: Not on file     Gets together: Not on file     Attends Shinto service: Not on file     Active member of club or organization: Not on file     Attends meetings of clubs or organizations: Not on file     Relationship status: Not on file     Intimate partner violence:     Fear of current or ex partner: Not on file     Emotionally abused: Not on file     Physically abused: Not on file     Forced sexual activity: Not on file   Other Topics Concern     Not on file   Social History Narrative     Not on file     Family History   Problem Relation Age of Onset     Heart disease Father      Hypertension Father      Diabetes Brother      Hypertension Brother      Hypertension Brother      Review of systems is as stated in HPI, and the remainder of system review is otherwise negative.    Objective:      /90   Pulse (!) 54   Temp 98.6  F (37  C)   Ht 5' 8\" (1.727 m)   Wt (!) 239 lb 7 oz (108.6 kg)   SpO2 95%   BMI 36.41 kg/m      General appearance: awake, NAD  HEENT: atraumatic, normocephalic, PERRL, no scleral icterus or injection, TMs normal bilaterally without erythema or effusion, nose grossly normal without rhinorrhea, no erythema of posterior oropharynx, moist mucous membranes  Neck: supple, no lymphadenopathy, normal ROM  CV: RRR, no murmurs/rubs/gallops, normal S1 and S2  Lungs: CTAB, no wheezes or crackles, breathing comfortably on room air, frequent cough observed  Extremities: moving all extremities  Skin: no rashes or lesions  Neuro: alert, oriented x3, CNs grossly intact, no focal deficits appreciated  Psych: normal mood/affect/behavior, answering questions appropriately, linear thought " process

## 2021-06-05 NOTE — TELEPHONE ENCOUNTER
Question following Office Visit  When did you see your provider: 2/4/20  What is your question: Patient was given doxycycline 100 mg, 1 capsule two times a day and Tessalon Pearls 100 mg, 1 capsule three times a day.  Patient states that these medications is not helping at all with any relief of symptoms.  Patient was sent home from work yesterday due to her coughing.  She is having to sleep in a chair to decrease coughing somewhat.  She is doing warm fluids, warm showers and vaporizer.  She has missed four days of work.   She wants different medication. She did use a Z-pack in the past which she recalls working for her to clear her symptoms.  Would provider order Z-pack and a stronger cough suppressant she could take at night?    Patient requests that this request be send to her primary provider, Celia Falcon.  She does not want to come back for another evaluation.  Okay to leave a detailed message: Yes

## 2021-06-09 NOTE — PROGRESS NOTES
Akanksha is a 61 y.o. female presenting to the clinic for multiple concerns.  Patient has history of 3 knee replacements, 2 in the left knee.  Patient has been experiencing cramping in both legs for multiple years.  She has seen both Dr. Webster and an internal medicine doctor through [facility].  States she had multiple studies performed on her legs.  The internal medicine provider suspects that it is due to sitting for prolonged periods at her workplace.  Patient states Dr. Webster prescribed tramadol and hydroxyzine.  She has had 40 tablets which have lasted her for 2 years.  Patient states the cramping occurs every few months.  She states this is the medication regimen which has helped the most for her.  Patient is also concerned of cold symptoms for 2 weeks.  She had similar symptoms at the end of January which improved on its own.  She complains of a nonproductive cough, shortness of breath, chest tightness, wheezing.  She has had some right thoracic pain.  She has felt feverish but has not taken her temperature.  She complains of a headache and postnasal drainage.  She denies stomachache, nausea, vomiting.  She has been taking over-the-counter Tylenol and NyQuil.  Multiple coworkers are ill.    Review of Systems: A complete 14 point review of systems was obtained and is negative or as stated in the history of present illness.    Social History     Social History     Marital status: Single     Spouse name: N/A     Number of children: N/A     Years of education: N/A     Occupational History     Not on file.     Social History Main Topics     Smoking status: Never Smoker     Smokeless tobacco: Not on file     Alcohol use Yes      Comment: rare     Drug use: No     Sexual activity: Not on file      Comment: single      Other Topics Concern     Not on file     Social History Narrative       Active Ambulatory Problems     Diagnosis Date Noted     Diverticulosis      Fatty Liver      Obesity      Osteoarthritis Of  "The Knee      Essential Hypercholesterolemia      Sinus Bradycardia      Resolved Ambulatory Problems     Diagnosis Date Noted     Acute pharyngitis      Midback Pain      Cramp of limb      Tingling (Paresthesia)      Shortness of breath      No Additional Past Medical History       Family History   Problem Relation Age of Onset     Heart disease Father      Hypertension Father      Diabetes Brother      Hypertension Brother      Hypertension Brother        OBJECTIVE:     Visit Vitals     /80     Pulse 61     Ht 5' 8.5\" (1.74 m)     Wt (!) 238 lb 11.2 oz (108.3 kg)     SpO2 96%     BMI 35.77 kg/m2       Patient is alert, in no obvious distress.   Skin: Warm, dry.  No lesions or rashes.  Skin turgor rapid return.   HEENT:  Head normocephalic, atraumatic.  Eyes normal. Ears normal.  Nose patent, mucosa red.  Oropharynx erythematous.  No lesions or tonsillar enlargement.   Neck: Supple, no lymphadenopathy.  Lungs:  Clear to auscultation. Respirations even and unlabored.  No wheezing or rales noted.   Heart:  Regular rate and rhythm.  No murmurs.  Musculoskeletal: Both legs are nontender to palpation.  There are no obvious varicose veins present.    LABORATORY: I ordered and personally reviewed a chest x-ray showing a questionable infiltrate in right lower lobe..  Will have radiology review.      ASSESSMENT AND PLAN:     1. Pneumonia of right lower lobe due to infectious organism  We will treat with Z-Petar.  Educators indications and side effects.  Will notify patient of radiology results.  If confirmed, the patient follow-up in 1 month for repeat chest x-ray to assess for resolution.  Discussed symptomatic treatment.  - azithromycin (ZITHROMAX Z-PETAR) 250 MG tablet; Take two tablets (500 mg) by mouth daily x 1, then one tablet (250 mg) by mouth daily on days #2-5  Dispense: 6 tablet; Refill: 0    2. Cough  - XR Chest PA and Lateral; Future    3. Cramp of both lower extremities  We will treat with tramadol and " hydroxyzine as needed.  Will monitor usage of this.  Patient has found the most relief with this medication.  She will follow-up in 6-12 months for medication management or sooner with any further concerns.  - traMADol (ULTRAM) 50 mg tablet; Take 1-2 tablets ( mg total) by mouth every 6 (six) hours as needed for pain.  Dispense: 40 tablet; Refill: 0  - hydrOXYzine (ATARAX) 25 MG tablet; Take 1-2 tablets (25-50 mg total) by mouth every 6 (six) hours as needed.  Dispense: 40 tablet; Refill: 0

## 2021-06-11 NOTE — PROGRESS NOTES
"  Assessment/Plan:     1. Elevated blood pressure  No significant concerns for blood pressure today.  Patient will plan to continue to monitor and follow-up with primary care provider.  Letter written to give to work per patient's request.    2. Essential Hypercholesterolemia  Currently well controlled.  Will scan recent outside labs into chart provided refill.  - atorvastatin (LIPITOR) 10 MG tablet; Take 1 tablet (10 mg total) by mouth at bedtime.  Dispense: 90 tablet; Refill: 3    3. Carotid artery plaque, bilateral  Patient had \"mild \"plaque buildup on life screening.  Requests further evaluation will order as above to get more objective numbers.  - US Carotid Bilateral; Future    Subjective:      Akanksha Cohn is a 62 y.o. female comes in today primarily concerned for her blood pressure.  There was a health screening at work and her blood pressure was significantly elevated.  She states that she thinks they did it wrong the cup was very tight and left Kaz.  She states that several other people in her group had high blood pressure readings.  She states she has never had high blood pressure she was not in pain or in any significant anxiety.  She states that the first screening number was over 200 and then it did go down to 168.  She also had labs done has those to review and will be scanned in.  LDL is quite low.  My first time meeting with her so did briefly review her past history and last visit with primary care provider.  She also wants review [facility] screening she had earlier she still has some concern because carotid artery showed mild plaque but there were no reported objective findings or discussion of stenosis.  She like further workup.  She has no dizziness palpitation shortness of breath swelling headaches or other significant concerns she feels well overall.    Current Outpatient Prescriptions   Medication Sig Dispense Refill     ascorbic acid (VITAMIN C) 250 MG tablet Take 500 mg by mouth daily.   "     aspirin-acetaminophen-caffeine (EXCEDRIN MIGRAINE) 250-250-65 mg per tablet Take 2 tablets by mouth daily.       atorvastatin (LIPITOR) 10 MG tablet Take 1 tablet (10 mg total) by mouth at bedtime. 90 tablet 3     biotin 2,500 mcg cap Take 1 tablet by mouth daily.       calcium carbonate-vitamin D3 600 mg calcium- 200 unit cap Take 1 tablet by mouth daily.       hydrOXYzine (ATARAX) 25 MG tablet Take 1-2 tablets (25-50 mg total) by mouth every 6 (six) hours as needed. 40 tablet 0     OMEGA-3/DHA/EPA/FISH OIL (FISH OIL-OMEGA-3 FATTY ACIDS) 300-1,000 mg capsule Take 1 g by mouth daily.       PAMABROM (DIUREX MAX ORAL) Take 1 tablet by mouth 2 (two) times a day.       PROCYAN OLIG/UBI/VIT A/HB#155 (PYCNOGENOL COMPLEX ORAL) Take 1 tablet by mouth 2 (two) times a day.       traMADol (ULTRAM) 50 mg tablet Take 1-2 tablets ( mg total) by mouth every 6 (six) hours as needed for pain. 40 tablet 0     triamcinolone (KENALOG) 0.1 % cream Apply to the affected area twice daily as needed 45 g 1     azithromycin (ZITHROMAX Z-PETAR) 250 MG tablet Take two tablets (500 mg) by mouth daily x 1, then one tablet (250 mg) by mouth daily on days #2-5 6 tablet 0     No current facility-administered medications for this visit.        Past Medical History, Family History, and Social History reviewed.  No past medical history on file.  No past surgical history on file.  Review of patient's allergies indicates no known allergies.  Family History   Problem Relation Age of Onset     Heart disease Father      Hypertension Father      Diabetes Brother      Hypertension Brother      Hypertension Brother      Social History     Social History     Marital status: Single     Spouse name: N/A     Number of children: N/A     Years of education: N/A     Occupational History     Not on file.     Social History Main Topics     Smoking status: Never Smoker     Smokeless tobacco: Not on file     Alcohol use Yes      Comment: rare     Drug use: No      Sexual activity: Not on file      Comment: single      Other Topics Concern     Not on file     Social History Narrative         Review of systems is as stated in HPI, and the remainder of the 10 system review is otherwise negative.    Objective:     Vitals:    06/22/17 0820 06/22/17 0826   BP: 140/80 136/80   Pulse: (!) 55    SpO2: 98%    Weight: (!) 233 lb (105.7 kg)     Body mass index is 34.91 kg/(m^2).    General Appearance:    Alert, cooperative, no distress, appears stated age   Head:    Normocephalic, without obvious abnormality, atraumatic   Eyes:    PERRL   Ears:    Normal external ear canals   Nose:   Mucosa normal, no drainage       Throat:   Oropharynx is clear   Neck:   Supple, symmetrical, no adenopathy, no thyromegally, no carotid bruit        Lungs:     Clear to auscultation bilaterally, respirations unlabored   Chest Wall:    No tenderness or deformity    Heart:    Regular rate and rhythm, S1 and S2 normal, no murmur, rub    or gallop                   Extremities:   Extremities normal, atraumatic, no cyanosis or edema   Pulses:   2+ and symmetric all extremities   Skin:   No rashes or lesions         This note has been dictated using voice recognition software. Any grammatical or context distortions are unintentional and inherent to the the software.

## 2021-06-12 NOTE — PROGRESS NOTES
Assessment and Plan:     1. Edema, unspecified type  Patient has been taking an over-the-counter diuretic.  We will have her discontinue this.  Due to her elevated blood pressure, will start hydrochlorothiazide 12.5 mg daily.  Educated on its indications and side effects.  She is to follow-up in 2 weeks for blood pressure and BMP recheck.  - hydroCHLOROthiazide (HYDRODIURIL) 12.5 MG tablet; Take 1 tablet (12.5 mg total) by mouth daily.  Dispense: 30 tablet; Refill: 0    2. Elevated blood pressure reading  We will start hydrochlorothiazide 12.5 mg daily.  Educated on its medications and side effects.  Will check CMP today.    3. Mixed hyperlipidemia  We will check lipid cascade and adjust atorvastatin accordingly.  She continues 20 mg daily.  - Lipid Cascade    4. Cramp of both lower extremities  She has a history of osteoarthritis and has had bilateral knee replacements.  She continues to experience lower extremity cramping.  She continues tramadol as needed.  Will monitor use.  - traMADoL (ULTRAM) 50 mg tablet; Take 1-2 tablets ( mg total) by mouth every 6 (six) hours as needed for pain.  Dispense: 20 tablet; Refill: 0    5. Eczema, unspecified type  She continues triamcinolone cream as needed.  - triamcinolone (KENALOG) 0.1 % cream; Apply to the affected area twice daily as needed.  No longer than 2 weeks in duration.  Dispense: 240 g; Refill: 3    6. Morbid obesity (H)  Discussed weight loss goals.  The following high BMI interventions were performed this visit: encouragement to exercise and lifestyle education regarding diet    7. Medication management  - Comprehensive Metabolic Panel    8. Health care maintenance  - Influenza,Quad,High Dose,PF 65 YR+    9. Colon cancer screening  - Ambulatory referral for Colonoscopy      Subjective:     Akanksha is a 65 y.o. female presenting to the clinic for medication management.  Patient experiences eczema on her hands during the winter.  She uses triamcinolone cream  as needed.  Patient has a history of osteoarthritis of both knees.  She has had 2 knee replacements within the left knee and one knee replacement within the right knee.  Dr. Webster prescribed tramadol due to chronic pain.  Patient takes it sparingly when she experiences a severe leg cramps.  Patient also takes an over-the-counter diuretic due to lower extremity edema.  Patient experiences swelling in her legs after she sits for most of the day at work.  She is taking atorvastatin for hyperlipidemia.  She is consuming a healthy diet and walks for exercise.  Last cholesterol check was on 7/11/2019 with a total cholesterol 166, triglycerides 195, HDL 52, LDL 75.  She denies chest pain, shortness of breath with exertion, orthopnea, syncope.    Review of Systems: A complete 14 point review of systems was obtained and is negative or as stated in the history of present illness.    Social History     Socioeconomic History     Marital status: Single     Spouse name: Not on file     Number of children: Not on file     Years of education: Not on file     Highest education level: Not on file   Occupational History     Not on file   Social Needs     Financial resource strain: Not on file     Food insecurity     Worry: Not on file     Inability: Not on file     Transportation needs     Medical: Not on file     Non-medical: Not on file   Tobacco Use     Smoking status: Never Smoker     Smokeless tobacco: Never Used   Substance and Sexual Activity     Alcohol use: Yes     Comment: rare     Drug use: No     Sexual activity: Not on file     Comment: single    Lifestyle     Physical activity     Days per week: Not on file     Minutes per session: Not on file     Stress: Not on file   Relationships     Social connections     Talks on phone: Not on file     Gets together: Not on file     Attends [employer] service: Not on file     Active member of club or organization: Not on file     Attends meetings of clubs or organizations: Not on  file     Relationship status: Not on file     Intimate partner violence     Fear of current or ex partner: Not on file     Emotionally abused: Not on file     Physically abused: Not on file     Forced sexual activity: Not on file   Other Topics Concern     Not on file   Social History Narrative     Not on file       Active Ambulatory Problems     Diagnosis Date Noted     Diverticulosis      Fatty Liver      Obesity      Osteoarthritis Of The Knee      Essential Hypercholesterolemia      Sinus Bradycardia      Hyperlipidemia 08/06/2012     Menopause 08/17/2017     Obesity (BMI 35.0-39.9) with comorbidity (H) 07/11/2019     Resolved Ambulatory Problems     Diagnosis Date Noted     Acute pharyngitis      Midback Pain      Cramp of limb      Tingling (Paresthesia)      Shortness of breath      No Additional Past Medical History       Family History   Problem Relation Age of Onset     Pacemaker Mother      Heart disease Father      Hypertension Father      Diabetes Brother      Hypertension Brother      Hypertension Brother        Objective:     /72 (Patient Site: Left Arm, Patient Position: Sitting, Cuff Size: Adult Large)   Pulse 60   Wt (!) 235 lb 1.6 oz (106.6 kg)   SpO2 96%   BMI 35.75 kg/m      Patient is alert, in no obvious distress.   Skin: Warm, dry.  No lesions or rashes.  Skin turgor rapid return.   HEENT:  Head normocephalic, atraumatic.  Eyes normal. Ears normal.  Nose patent, mucosa pink.  Oropharynx mucosa pink.  No lesions or tonsillar enlargement.   Neck: Supple, no lymphadenopathy.No thyromegaly.  Lungs:  Clear to auscultation. Respirations even and unlabored.  No wheezing or rales noted.   Heart:  Regular rate and rhythm.  No murmurs, S3, S4, gallops, or rubs.    Abdomen: Soft, nontender.  No organomegaly. Bowel sounds normoactive. No guarding or masses noted.   Musculoskeletal: +1 nonpitting edema is present in bilateral lower extremities.

## 2021-06-13 NOTE — TELEPHONE ENCOUNTER
I sent the prescription to the pharmacy.  I see that we discussed the Tramadol, but I do not believe we discussed the Hydroxyzine.  She should be very cautious taking the two together as it can cause drowsiness.  I encourage her to avoid taking other sedatives with both.  Thanks.

## 2021-06-13 NOTE — PROGRESS NOTES
Assessment/Plan:     1. Bronchitis  Rapid strep negative.  Discussed supportive care prescription sent as below call or return to care symptoms worsen or do not improve.  - Rapid Strep A Screen-Throat  - Group A Strep, RNA Direct Detection, Throat  - azithromycin (ZITHROMAX Z-PETAR) 250 MG tablet; Take 2 tablets (500 mg) on  Day 1,  followed by 1 tablet (250 mg) once daily on Days 2 through 5.  Dispense: 6 tablet; Refill: 0      When briefly reviewing her updates of preventative care she tells me that she sees gynecology yearly for physical.  She has not been seen at this office for annual physical.  She tells me that she is up-to-date tells me she had a colonoscopy less than 10 years ago and it was normal we did have her sign records for release of information to review.      Subjective:      Akanksha Cohn is a 62 y.o. female comes in today for a cough states that she has been ill for about 4 weeks she feels it has been pretty consistent although people at her office have been ill intermittently.  She states that it started with a sore throat although was somewhat mild it has been having intermittent sore throat she is using NyQuil DayQuil.  She states she feels she has a deep dry cough is not productive she has no significant postnasal drip no sinus pain she does not feel wheezy does not have history of asthma.  She states she feels clammy but does not believe she has had much of a fever.  She has not recently been on antibiotics.  States she had similar about a year ago tried amoxicillin that did not work so she took Zithromax that works wonders if she could try that this time.  No other new concerns today.  Briefly reviewed last visit note from primary care provider.    Current Outpatient Prescriptions   Medication Sig Dispense Refill     ascorbic acid (VITAMIN C) 250 MG tablet Take 500 mg by mouth daily.       aspirin-acetaminophen-caffeine (EXCEDRIN MIGRAINE) 250-250-65 mg per tablet Take 2 tablets by mouth  daily.       atorvastatin (LIPITOR) 10 MG tablet Take 1 tablet (10 mg total) by mouth at bedtime. 90 tablet 3     azithromycin (ZITHROMAX Z-PETAR) 250 MG tablet Take two tablets (500 mg) by mouth daily x 1, then one tablet (250 mg) by mouth daily on days #2-5 6 tablet 0     biotin 2,500 mcg cap Take 1 tablet by mouth daily.       calcium carbonate-vitamin D3 600 mg calcium- 200 unit cap Take 1 tablet by mouth daily.       hydrOXYzine (ATARAX) 25 MG tablet Take 1-2 tablets (25-50 mg total) by mouth every 6 (six) hours as needed. 40 tablet 0     OMEGA-3/DHA/EPA/FISH OIL (FISH OIL-OMEGA-3 FATTY ACIDS) 300-1,000 mg capsule Take 1 g by mouth daily.       PAMABROM (DIUREX MAX ORAL) Take 1 tablet by mouth 2 (two) times a day.       PROCYAN OLIG/UBI/VIT A/HB#155 (PYCNOGENOL COMPLEX ORAL) Take 1 tablet by mouth 2 (two) times a day.       traMADol (ULTRAM) 50 mg tablet Take 1-2 tablets ( mg total) by mouth every 6 (six) hours as needed for pain. 40 tablet 0     triamcinolone (KENALOG) 0.1 % cream Apply to the affected area twice daily as needed 45 g 1     amoxicillin (AMOXIL) 500 MG tablet DNT  3     ascorbic acid, vitamin C, (VITAMIN C) 500 MG tablet Take 500 mg by mouth.       aspirin 325 MG tablet Take 325 mg by mouth.       atorvastatin (LIPITOR) 10 MG tablet Take 10 mg by mouth.       azithromycin (ZITHROMAX Z-PETAR) 250 MG tablet Take 2 tablets (500 mg) on  Day 1,  followed by 1 tablet (250 mg) once daily on Days 2 through 5. 6 tablet 0     omega-3 fatty acids-fish oil 340-1,000 mg cap Take by mouth.       triamcinolone (KENALOG) 0.1 % ointment Apply  topically to affected area(s) 3 times daily prn       No current facility-administered medications for this visit.      No Known Allergies  Past Medical History, Family History, and Social History reviewed.  No past medical history on file.  No past surgical history on file.  Review of patient's allergies indicates no known allergies.  Family History   Problem Relation  "Age of Onset     Heart disease Father      Hypertension Father      Diabetes Brother      Hypertension Brother      Hypertension Brother      Social History     Social History     Marital status: Single     Spouse name: N/A     Number of children: N/A     Years of education: N/A     Occupational History     Not on file.     Social History Main Topics     Smoking status: Never Smoker     Smokeless tobacco: Not on file     Alcohol use Yes      Comment: rare     Drug use: No     Sexual activity: Not on file      Comment: single      Other Topics Concern     Not on file     Social History Narrative         Review of systems is as stated in HPI, and the remainder of the 10 system review is otherwise negative.    Objective:     Vitals:    10/05/17 1338   BP: 118/62   Patient Site: Right Arm   Patient Position: Sitting   Cuff Size: Adult Large   Pulse: (!) 59   Temp: 98.1  F (36.7  C)   SpO2: 96%   Weight: (!) 233 lb 1.6 oz (105.7 kg)   Height: 5' 8.5\" (1.74 m)    Body mass index is 34.93 kg/(m^2).  Wt Readings from Last 3 Encounters:   10/05/17 (!) 233 lb 1.6 oz (105.7 kg)   06/22/17 (!) 233 lb (105.7 kg)   03/09/17 (!) 238 lb 11.2 oz (108.3 kg)       General Appearance:    Alert, cooperative, no distress, appears stated age    Head:    Normocephalic, without obvious abnormality, atraumatic   Eyes:    PERRL, EOM's intact, no conjunctivitis    Ears:    Normal TM's and external ear canals   Nose:   Mucosa normal, no drainage     or sinus tenderness   Throat:   Oropharynx is clear   Neck:   Supple, symmetrical, no adenopathy, no thyromegally, no carotid bruit        Lungs:     Clear to auscultation bilaterally, respirations unlabored   Chest Wall:    No tenderness or deformity    Heart:    Regular rate and rhythm, S1 and S2 normal, no murmur, rub    or gallop                   Extremities:   Extremities normal, atraumatic, no cyanosis or edema           Psych:   grossly normal mood and affect without acute anxiety or " psychosis    Skin:   No rashes or lesions   :          This note has been dictated using voice recognition software. Any grammatical or context distortions are unintentional and inherent to the software.

## 2021-06-13 NOTE — TELEPHONE ENCOUNTER
"Spoke with patient and notified her of the below message. She became upset and states she discussed the use of hydroxyzine with Celia at her last visit in October and for whatever reason this prescription did not get sent to the pharmacy.. Patient states things are always \"Half assed\" through  [email or web address] [place]. She has a high deductible plan and does not want to pay for another visit to discuss this medication as she states she already talked to Celia about it in October.   "

## 2021-06-13 NOTE — TELEPHONE ENCOUNTER
Can you help her arrange a nurse only appt and lab only appointment.  I spoke with her yesterday and notified her to continue this medication.

## 2021-06-13 NOTE — TELEPHONE ENCOUNTER
Medication Question or Clarification  Who is calling: Akanksha  What medication are you calling about (include dose and sig)?: hydrochlorothiazide 12.5 mg, one daily  Who prescribed the medication?: Celia Falcon CNP   What is your question/concern?: This medication was tolerated by patient.  Is she suppose to continue?  If so, please send a 90 day supply with refills.  Requested Pharmacy: [facility]  Okay to leave a detailed essage?: Yes

## 2021-06-13 NOTE — TELEPHONE ENCOUNTER
Left message to call back for: medication request  Information to relay to patient:  Below message

## 2021-06-13 NOTE — TELEPHONE ENCOUNTER
Medication Request  Medication name: Hydroxyzine 25 mg, 1-2 tablets every 6 hours.  To be taken with Tramadol is taken.  Requested Pharmacy: [facility]  Reason for request: [facility] told her she should be taking hydroxyzine along with tramadol.  This would be for muscle spasms.    When did you use medication last?:  2015  Patient offered appointment:  patient declined  Okay to leave a detailed message: yes

## 2021-06-15 NOTE — TELEPHONE ENCOUNTER
Refill Approved    Rx renewed per Medication Renewal Policy. Medication was last renewed on 11/18/20.    Kaz Adams, [facility] Triage/Med Refill 2/8/2021     Requested Prescriptions   Pending Prescriptions Disp Refills     hydrOXYzine HCL (ATARAX) 25 MG tablet [Pharmacy Med Name: HYDROXYZ HCL TAB 25MG] 30 tablet 0     Sig: TAKE 1 TABLET EVERY 8 HOURSAS NEEDED       Antihistamine Refill Protocol Passed - 2/8/2021  7:36 AM        Passed - Patient has had office visit/physical in last year     Last office visit with prescriber/PCP: 10/6/2020 Celia Falcon CNP OR same dept: 10/6/2020 Celia Falcon CNP OR same specialty: 10/6/2020 Celia Falcon CNP  Last physical: 6/14/2018 Last MTM visit: Visit date not found   Next visit within 3 mo: Visit date not found  Next physical within 3 mo: Visit date not found  Prescriber OR PCP: Celia Falcon CNP  Last diagnosis associated with med order: 1. Cramp of both lower extremities  - hydrOXYzine HCL (ATARAX) 25 MG tablet [Pharmacy Med Name: HYDROXYZ HCL TAB 25MG]; TAKE 1 TABLET EVERY 8 HOURSAS NEEDED  Dispense: 30 tablet; Refill: 0    If protocol passes may refill for 12 months if within 3 months of last provider visit (or a total of 15 months).

## 2021-06-16 PROBLEM — Z78.0 MENOPAUSE: Status: ACTIVE | Noted: 2017-08-17

## 2021-06-16 PROBLEM — E66.01 MORBID OBESITY (H): Status: ACTIVE | Noted: 2019-07-11

## 2021-06-17 NOTE — TELEPHONE ENCOUNTER
Refill Approved    Rx renewed per Medication Renewal Policy. Medication was last renewed on 11/18/2020.  Last OV 10/6/2020.    Marianne Whitehead, [facility] Connection Triage/Med Refill 5/3/2021     Requested Prescriptions   Pending Prescriptions Disp Refills     hydroCHLOROthiazide (HYDRODIURIL) 12.5 MG tablet 90 tablet 1     Sig: Take 1 tablet (12.5 mg total) by mouth daily.       Diuretics/Combination Diuretics Refill Protocol  Passed - 5/3/2021  7:09 AM        Passed - Visit with PCP or prescribing provider visit in past 12 months     Last office visit with prescriber/PCP: 10/6/2020 Celia Falcon CNP OR same dept: 10/6/2020 Celia Falcon CNP OR same specialty: 10/6/2020 Celia Falcon CNP  Last physical: 6/14/2018 Last MTM visit: Visit date not found   Next visit within 3 mo: Visit date not found  Next physical within 3 mo: Visit date not found  Prescriber OR PCP: Celia Falcon CNP  Last diagnosis associated with med order: 1. Edema, unspecified type  - hydroCHLOROthiazide (HYDRODIURIL) 12.5 MG tablet; Take 1 tablet (12.5 mg total) by mouth daily.  Dispense: 90 tablet; Refill: 1    If protocol passes may refill for 12 months if within 3 months of last provider visit (or a total of 15 months).             Passed - Serum Potassium in past 12 months      Lab Results   Component Value Date    Potassium 4.0 12/04/2020             Passed - Serum Sodium in past 12 months      Lab Results   Component Value Date    Sodium 141 12/04/2020             Passed - Blood pressure on file in past 12 months     BP Readings from Last 1 Encounters:   12/17/20 130/80             Passed - Serum Creatinine in past 12 months      Creatinine   Date Value Ref Range Status   12/04/2020 0.72 0.60 - 1.10 mg/dL Final

## 2021-06-17 NOTE — TELEPHONE ENCOUNTER
"Caller requests hydrochlorothiazide refill.  \"Has a few tablets left.\"  See separate med refill encounter created.  (Denies any symptoms requiring a triage encounter at this time.)    Sunita Whitehead RN  [facility] Triage     Reason for Disposition    Caller requesting a refill, no triage required, and triager able to refill per department policy    Protocols used: MEDICATION QUESTION CALL-A-OH      "

## 2021-06-18 NOTE — PROGRESS NOTES
Assessment and Plan:    1. Routine general medical examination at a health care facility  Discussed consuming a healthy diet and exercising.  Discussed importance routine sunscreen use.  Discussed adequate calcium and vitamin D intake.  Updated shingles vaccine.  She receives mammograms at [facility]. She had a colonoscopy at [facility] within the last 10 years.  We will try to obtain records.  - Hemoglobin  - Varicella Zoster, Recombinant Vaccine IM    2. Essential Hypercholesterolemia  She is taking atorvastatin 10 mg daily.  Will adjust according to lipid cascade results.  - Lipid Cascade  - atorvastatin (LIPITOR) 10 MG tablet; Take 1 tablet (10 mg total) by mouth at bedtime.  Dispense: 90 tablet; Refill: 3    3. Chronic pain of left knee  Per orthopedic recommendations, she takes Excedrin in the morning to assist with pain and Diurex to assist with swelling.  She continues tramadol sparingly.  Will continue to monitor use.    4. Obesity  The following high BMI interventions were performed this visit: dietary needs education, exercise promotion: strength training and exercise promotion: stretching    5. Eczema, unspecified type  She continues triamcinolone as needed.    6. Medication management  - Comprehensive Metabolic Panel    7. Chronic cough  Differentials include allergic rhinitis, postviral cough, vocal cord dysfunction, asthma, GERD.  Patient does not feel as though she has any of these.  She does not want treatment for any these conditions as well.  Did offer referral to pulmonology, but she declines.  Discussed using an over-the-counter antihistamine to assist with any underlying post nasal drainage.  We also discussed trying over-the-counter omeprazole to assist with underlying acid reflux.  Patient will notify me if she changes her mind.  She will follow-up if symptoms persist or worsen.  - XR Chest 2 Views; Future      Subjective:     Akanksha is a 63 y.o. female presenting to the clinic  for a female physical.     LMP: hysterectomy 1999 for menorrhagia   Hx of abnormal pap smear: multiple times; colposcopies, LEEP   Last pap smear: last July normal   Perform self-breast exams: yes  Vaginal discharge or irritation: none   Sexually active: single, not currently   Contraception: none   Concerns for STDs: none   Previous pregnancies:one pregnancy, vaginal delivery   Daughter is 42; two grandchildren     Patient has hyperlipidemia.  Last cholesterol check was on 9/29/60 with a total cholesterol 157, triglycerides 203, HDL 45, LDL 71.  She is taking atorvastatin 10 mg daily.     She has a history of bilateral knee replacements.  She sees orthopedics who recommends that she take Excedrin in the morning for pain.  She also takes diurex to assist with swelling in her knees because she sits all day at work.  She takes tramadol sparingly for cramps in her legs.  She received 40 tablets last year and has not used them.  States she has rods in her legs which exacerbate her symptoms.  She uses triamcinolone cream as needed for eczema which is exacerbated during the winter.    Patient is also concerned of an ongoing cough.  She developed cold symptoms in October.  The cough never improved.  She describes the pain as a nonproductive cough.  She denies sinus congestion, postnasal drainage, sore throat, headache, stomachache, nausea, vomiting, fever.  She does feel short of breath when she has a coughing fit in the evening.  She has tried numerous over-the-counter cold medications.  She denies allergies and has not tried any allergy medication.    Review of systems:  I performed a 10 point review of systems.  All pertinent positives and negatives are noted in the HPI. All others are negative.     Not on File    Current Outpatient Prescriptions on File Prior to Visit   Medication Sig Dispense Refill     amoxicillin (AMOXIL) 500 MG tablet DNT  3     ascorbic acid, vitamin C, (VITAMIN C) 500 MG tablet Take 500 mg by  mouth.       aspirin 325 MG tablet Take 325 mg by mouth.       aspirin-acetaminophen-caffeine (EXCEDRIN MIGRAINE) 250-250-65 mg per tablet Take 2 tablets by mouth daily.       atorvastatin (LIPITOR) 10 MG tablet Take 1 tablet (10 mg total) by mouth at bedtime. 90 tablet 3     biotin 2,500 mcg cap Take 1 tablet by mouth daily.       calcium carbonate-vitamin D3 600 mg calcium- 200 unit cap Take 1 tablet by mouth daily.       omega-3 fatty acids-fish oil 340-1,000 mg cap Take by mouth.       PAMABROM (DIUREX MAX ORAL) Take 1 tablet by mouth 2 (two) times a day.       PROCYAN OLIG/UBI/VIT A/HB#155 (PYCNOGENOL COMPLEX ORAL) Take 1 tablet by mouth 2 (two) times a day.       traMADol (ULTRAM) 50 mg tablet Take 1-2 tablets ( mg total) by mouth every 6 (six) hours as needed for pain. 40 tablet 0     triamcinolone (KENALOG) 0.1 % cream Apply to the affected area twice daily as needed.  No longer than 2 weeks in duration. 240 g 3     [DISCONTINUED] ascorbic acid (VITAMIN C) 250 MG tablet Take 500 mg by mouth daily.       [DISCONTINUED] atorvastatin (LIPITOR) 10 MG tablet Take 10 mg by mouth.       [DISCONTINUED] azithromycin (ZITHROMAX Z-PETAR) 250 MG tablet Take two tablets (500 mg) by mouth daily x 1, then one tablet (250 mg) by mouth daily on days #2-5 6 tablet 0     [DISCONTINUED] OMEGA-3/DHA/EPA/FISH OIL (FISH OIL-OMEGA-3 FATTY ACIDS) 300-1,000 mg capsule Take 1 g by mouth daily.       No current facility-administered medications on file prior to visit.        Social History     Social History     Marital status: Single     Spouse name: N/A     Number of children: N/A     Years of education: N/A     Occupational History     Not on file.     Social History Main Topics     Smoking status: Never Smoker     Smokeless tobacco: Never Used     Alcohol use Yes      Comment: rare     Drug use: No     Sexual activity: Not on file      Comment: single      Other Topics Concern     Not on file     Social History Narrative        History reviewed. No pertinent past medical history.    Family History   Problem Relation Age of Onset     Heart disease Father      Hypertension Father      Diabetes Brother      Hypertension Brother      Hypertension Brother        Past Surgical History:   Procedure Laterality Date     CERVICAL BIOPSY  W/ LOOP ELECTRODE EXCISION       HYSTERECTOMY       TOTAL KNEE ARTHROPLASTY Bilateral        Objective:     Vitals:    06/14/18 0748   BP: 138/80   Pulse:        Patient is alert, no obvious distress.   Skin: Warm, dry.  No rashes or lesions. Skin turgor rapid return.   HEENT:  Eyes normal.  Ears normal.  Nose patent, mucosa pink.  Oropharynx mucosa pink, no lesions or tonsil enlargement.   Neck:  Supple, without lymphadenopathy, bruits, JVD. Thyroid normal texture and size.    Lungs:  Clear to auscultation.  No wheezing, rales noted.  Respirations even and unlabored.   Heart:  Regular rate and rhythm.  No murmurs.   Breasts:  deferred  Abdomen: Soft, nontender.  No organomegaly.  Bowel sounds normoactive.  No guarding or masses noted.   : deferred  Musculoskeletal:  Full ROM of extremities.  Muscle strength equal +5/5.   Neurological:  Cranial nerves 2-12 intact.      Laboratory: I ordered and personally reviewed a chest x-ray showing no obvious infiltrate.  Will have radiology review.

## 2021-06-19 NOTE — LETTER
Letter by Celia Falcon CNP at      Author: Celia Falcon CNP Service: -- Author Type: --    Filed:  Encounter Date: 9/5/2019 Status: (Other)         Akanksha Cohn  [address] [place] [address]      September 5, 2019      Dear Akanksha    In reviewing your records, we have determined a gap in your preventive services. Based on your age and health history, we recommend the follow service.     ? Physical with a Pap Smear      If you have had the service elsewhere, please contact us so we can update our records. Please let us know if you have transferred your care to another clinic.    Please call [phone] to schedule this appointment.    We believe that a strong preventive care program, including regular physicals and follow-up care is an important part of a healthy lifestyle and we are committed to helping you maintain your health.    Thank you for choosing us as your health care provider.    Sincerely,     [facility]

## 2021-06-19 NOTE — LETTER
Letter by Celia Falcon CNP at      Author: Celia Falcon CNP Service: -- Author Type: --    Filed:  Encounter Date: 7/14/2019 Status: (Other)         Akanksha Cohn  [address]  [place] [address]             July 14, 2019         Dear Ms. Cohn,    Below are the results from your recent visit:    Resulted Orders   Lipid Cascade   Result Value Ref Range    Cholesterol 166 <=199 mg/dL    Triglycerides 195 (H) <=149 mg/dL    HDL Cholesterol 52 >=50 mg/dL    LDL Calculated 75 <=129 mg/dL    Patient Fasting > 8hrs? Yes    Comprehensive Metabolic Panel   Result Value Ref Range    Sodium 142 136 - 145 mmol/L    Potassium 4.2 3.5 - 5.0 mmol/L    Chloride 105 98 - 107 mmol/L    CO2 30 22 - 31 mmol/L    Anion Gap, Calculation 7 5 - 18 mmol/L    Glucose 88 70 - 125 mg/dL    BUN 17 8 - 22 mg/dL    Creatinine 0.72 0.60 - 1.10 mg/dL    GFR MDRD Af Amer >60 >60 mL/min/1.73m2    GFR MDRD Non Af Amer >60 >60 mL/min/1.73m2    Bilirubin, Total 0.6 0.0 - 1.0 mg/dL    Calcium 10.0 8.5 - 10.5 mg/dL    Protein, Total 6.9 6.0 - 8.0 g/dL    Albumin 4.2 3.5 - 5.0 g/dL    Alkaline Phosphatase 96 45 - 120 U/L    AST 16 0 - 40 U/L    ALT 18 0 - 45 U/L    Narrative    Fasting Glucose reference range is 70-99 mg/dL per  American Diabetes Association (ADA) guidelines.       Your labs are normal except your triglycerides are elevated.        Please call with questions or contact us using [employer].    Sincerely,        Electronically signed by Celia Falcon CNP

## 2021-06-20 NOTE — LETTER
Letter by Celia Falcon CNP at      Author: Celia Falcon CNP Service: -- Author Type: --    Filed:  Encounter Date: 10/6/2020 Status: (Other)         Akanksha Cohn  [address]  [place] [address]             October 6, 2020         Dear Ms. Cohn,    Below are the results from your recent visit:    Resulted Orders   Lipid Cascade   Result Value Ref Range    Cholesterol 162 <=199 mg/dL    Triglycerides 169 (H) <=149 mg/dL    HDL Cholesterol 48 (L) >=50 mg/dL    LDL Calculated 80 <=129 mg/dL    Patient Fasting > 8hrs? Yes    Comprehensive Metabolic Panel   Result Value Ref Range    Sodium 142 136 - 145 mmol/L    Potassium 4.2 3.5 - 5.0 mmol/L    Chloride 105 98 - 107 mmol/L    CO2 29 22 - 31 mmol/L    Anion Gap, Calculation 8 5 - 18 mmol/L    Glucose 89 70 - 125 mg/dL    BUN 21 8 - 22 mg/dL    Creatinine 0.76 0.60 - 1.10 mg/dL    GFR MDRD Af Amer >60 >60 mL/min/1.73m2    GFR MDRD Non Af Amer >60 >60 mL/min/1.73m2    Bilirubin, Total 0.5 0.0 - 1.0 mg/dL    Calcium 9.2 8.5 - 10.5 mg/dL    Protein, Total 6.9 6.0 - 8.0 g/dL    Albumin 4.3 3.5 - 5.0 g/dL    Alkaline Phosphatase 94 45 - 120 U/L    AST 16 0 - 40 U/L    ALT 17 0 - 45 U/L    Narrative    Fasting Glucose reference range is 70-99 mg/dL per  American Diabetes Association (ADA) guidelines.       Your labs are normal except your triglycerides remain mildly elevated.   I recommend you consume a healthy low-fat diet (avoid fast food, fried foods, processed foods, and butter) and exercise.  Avoiding alcohol will assist with lowering the triglycerides.       You can lower your cholesterol some if you avoid red meat, butter, fried foods, cheese, and other foods that have a lot of saturated fat. Other things that might help lower cholesterol include:  ?Eating more soluble fiber - Soluble fiber is found in fruits, oats, barley, beans, and peas.  ?A vegan diet - A vegan diet contains no animal products, such as meat, eggs, or milk.  But if you are interested in  "improving your health, it's best not to focus just on cholesterol. There are changes you can make to your diet that will reduce your risk of heart disease and other problems--even if they don't lower your cholesterol much.  No single diet is right for everyone. But in general, a healthy diet can include:  ?Lots of fruits, vegetables, and whole grains (examples of whole grains include whole wheat, oats, and barley)  ?Some beans, peas, lentils, chickpeas, and similar foods  ?Some nuts, such as walnuts, almonds, and peanuts  ?Some milk and milk products  ?Some fish  To have a healthy diet, it's also important to limit or avoid sugar, sweets, and refined grains. (Refined grains are found in white bread, white rice, most forms of pasta, and most packaged \"snack\" foods.)      Please call with questions or contact us using [employer].    Sincerely,        Electronically signed by Celia Falcon CNP       "

## 2021-06-21 NOTE — LETTER
Letter by Celia Falcon CNP at      Author: Celia Falcon CNP Service: -- Author Type: --    Filed:  Encounter Date: 12/4/2020 Status: (Other)         Akanksha Cohn  [address]  [place] [address]             December 4, 2020         Dear Ms. Cohn,    Below are the results from your recent visit:    Resulted Orders   Basic Metabolic Panel   Result Value Ref Range    Sodium 141 136 - 145 mmol/L    Potassium 4.0 3.5 - 5.0 mmol/L    Chloride 102 98 - 107 mmol/L    CO2 28 22 - 31 mmol/L    Anion Gap, Calculation 11 5 - 18 mmol/L    Glucose 93 70 - 125 mg/dL    Calcium 9.5 8.5 - 10.5 mg/dL    BUN 21 8 - 22 mg/dL    Creatinine 0.72 0.60 - 1.10 mg/dL    GFR MDRD Af Amer >60 >60 mL/min/1.73m2    GFR MDRD Non Af Amer >60 >60 mL/min/1.73m2    Narrative    Fasting Glucose reference range is 70-99 mg/dL per  American Diabetes Association (ADA) guidelines.       Your lab results are normal.     Please call with questions or contact us using [employer].    Sincerely,        Electronically signed by Celia Falcon CNP

## 2021-06-23 NOTE — TELEPHONE ENCOUNTER
RN cannot approve Refill Request    RN can NOT refill this medication no protocol attached to medication Last office visit: 3/9/2017 Celia Falcon CNP Last Physical: 6/14/2018 Last MTM visit: Visit date not found Last visit same specialty: 10/5/2017 Yamila Amaral DO.  Next visit within 3 mo: Visit date not found  Next physical within 3 mo: Visit date not found      Liana Gandara, [facility] Triage/Med Refill 2/8/2019    Requested Prescriptions   Pending Prescriptions Disp Refills     triamcinolone (KENALOG) 0.1 % cream 240 g 3     Sig: Apply to the affected area twice daily as needed.  No longer than 2 weeks in duration.    There is no refill protocol information for this order

## 2021-06-23 NOTE — TELEPHONE ENCOUNTER
Medication Request  Medication name: Kenalog ointment Order is historic  Pharmacy Name and Location: [facility]   Reason for request: Patient uses both ointment and cream.   When did you use medication last?:  daily  Okay to leave a detailed message: yes

## 2021-06-24 NOTE — TELEPHONE ENCOUNTER
RN cannot approve Refill Request    RN can NOT refill this medication med is not covered by policy/route to provider.    Tobi Price, [facility] Triage/Med Refill 3/12/2019    Requested Prescriptions   Pending Prescriptions Disp Refills     triamcinolone (KENALOG) 0.1 % ointment 30 g 0     Sig: Apply  topically to affected area(s) 3 times daily prn    There is no refill protocol information for this order

## 2021-06-30 NOTE — PROGRESS NOTES
Progress Notes by Yamila Olvera CMA at 12/4/2020  8:15 AM     Author: Yamila Olvera CMA Service: -- Author Type: Certified Medical Assistant    Filed: 12/4/2020  8:36 AM Encounter Date: 12/4/2020 Status: Attested    : Yamila Olvera CMA (Certified Medical Assistant) Cosigner: Celia Falcon CNP at 12/9/2020 10:02 AM    Attestation signed by Celia Falcon CNP at 12/9/2020 10:02 AM                     I met with Akanksha Cohn at the request of Celia Falcon to recheck her blood pressure.  Blood pressure medications on the MAR were reviewed with patient.    Patient has taken all medications as per usual regimen: Yes  Patient reports tolerating them without any issues or concerns: Yes    Vitals:    12/04/20 0821 12/04/20 0831   BP: (!) 164/100 146/90       After 5 minutes, the patient's blood pressure remained greater than or equal to 140/90.    Is the patient currently having any chest pain?  No  Does the patient currently have a headache?   No  Does the patient currently have any vision changes? No  Does the patient currently have any nausea? No  Does the patient currently have any abdominal pain? No    The previous encounter was reviewed.  The patient was discharged and the note will be sent to the provider for final review.     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

## 2021-07-03 NOTE — ADDENDUM NOTE
Addendum Note by Negar Maravilla CMA at 11/16/2017  4:01 PM     Author: Negar Maravilla CMA Service: -- Author Type: Certified Medical Assistant    Filed: 11/16/2017  4:01 PM Encounter Date: 10/24/2017 Status: Signed    : Negar Maravilla CMA (Certified Medical Assistant)    Addended by: NEGAR MARAVILLA on: 11/16/2017 04:01 PM        Modules accepted: Orders

## 2021-07-29 ENCOUNTER — OFFICE VISIT (OUTPATIENT)
Dept: FAMILY MEDICINE | Facility: CLINIC | Age: 66
End: 2021-07-29
Payer: COMMERCIAL

## 2021-07-29 VITALS
WEIGHT: 237.1 LBS | HEART RATE: 58 BPM | BODY MASS INDEX: 36.05 KG/M2 | DIASTOLIC BLOOD PRESSURE: 76 MMHG | SYSTOLIC BLOOD PRESSURE: 120 MMHG

## 2021-07-29 DIAGNOSIS — Z79.899 MEDICATION MANAGEMENT: ICD-10-CM

## 2021-07-29 DIAGNOSIS — E66.01 MORBID OBESITY (H): ICD-10-CM

## 2021-07-29 DIAGNOSIS — Z12.11 COLON CANCER SCREENING: ICD-10-CM

## 2021-07-29 DIAGNOSIS — E78.2 MIXED HYPERLIPIDEMIA: ICD-10-CM

## 2021-07-29 DIAGNOSIS — R60.9 EDEMA, UNSPECIFIED TYPE: ICD-10-CM

## 2021-07-29 DIAGNOSIS — I10 BENIGN ESSENTIAL HYPERTENSION: Primary | ICD-10-CM

## 2021-07-29 LAB
ANION GAP SERPL CALCULATED.3IONS-SCNC: 13 MMOL/L (ref 5–18)
BUN SERPL-MCNC: 26 MG/DL (ref 8–22)
CALCIUM SERPL-MCNC: 10.2 MG/DL (ref 8.5–10.5)
CHLORIDE BLD-SCNC: 100 MMOL/L (ref 98–107)
CHOLEST SERPL-MCNC: 161 MG/DL
CO2 SERPL-SCNC: 26 MMOL/L (ref 22–31)
CREAT SERPL-MCNC: 1.07 MG/DL (ref 0.6–1.1)
FASTING STATUS PATIENT QL REPORTED: YES
GFR SERPL CREATININE-BSD FRML MDRD: 54 ML/MIN/1.73M2
GLUCOSE BLD-MCNC: 100 MG/DL (ref 70–125)
HDLC SERPL-MCNC: 44 MG/DL
LDLC SERPL CALC-MCNC: 68 MG/DL
POTASSIUM BLD-SCNC: 4.8 MMOL/L (ref 3.5–5)
SODIUM SERPL-SCNC: 139 MMOL/L (ref 136–145)
TRIGL SERPL-MCNC: 243 MG/DL

## 2021-07-29 PROCEDURE — 99214 OFFICE O/P EST MOD 30 MIN: CPT | Performed by: NURSE PRACTITIONER

## 2021-07-29 PROCEDURE — 80061 LIPID PANEL: CPT | Performed by: NURSE PRACTITIONER

## 2021-07-29 PROCEDURE — 80048 BASIC METABOLIC PNL TOTAL CA: CPT | Performed by: NURSE PRACTITIONER

## 2021-07-29 PROCEDURE — 36415 COLL VENOUS BLD VENIPUNCTURE: CPT | Performed by: NURSE PRACTITIONER

## 2021-07-29 RX ORDER — BETAMETHASONE DIPROPIONATE 0.5 MG/G
LOTION TOPICAL
COMMUNITY
Start: 2021-03-06 | End: 2023-11-17

## 2021-07-29 RX ORDER — ATORVASTATIN CALCIUM 20 MG/1
20 TABLET, FILM COATED ORAL AT BEDTIME
Qty: 90 TABLET | Refills: 3 | Status: SHIPPED | OUTPATIENT
Start: 2021-07-29 | End: 2022-07-04

## 2021-07-29 RX ORDER — SPIRONOLACTONE 50 MG/1
TABLET, FILM COATED ORAL
COMMUNITY
Start: 2021-02-05 | End: 2022-10-27

## 2021-07-29 RX ORDER — HYDROCHLOROTHIAZIDE 12.5 MG/1
12.5 TABLET ORAL DAILY
Qty: 90 TABLET | Refills: 3 | Status: SHIPPED | OUTPATIENT
Start: 2021-07-29 | End: 2022-07-04

## 2021-07-29 RX ORDER — TRAMADOL HYDROCHLORIDE 50 MG/1
50-100 TABLET ORAL
COMMUNITY
Start: 2020-10-06 | End: 2021-07-29

## 2021-07-29 RX ORDER — IBUPROFEN 200 MG
TABLET ORAL
COMMUNITY
Start: 2019-08-15

## 2021-07-29 RX ORDER — ATORVASTATIN CALCIUM 10 MG/1
20 TABLET, FILM COATED ORAL
COMMUNITY
Start: 2019-08-15 | End: 2021-07-29

## 2021-07-29 NOTE — LETTER
July 29, 2021      Akanksha Cohn  [address]  [place] [address]        Dear ,    We are writing to inform you of your test results.    Your GFR (filtration rate of the kidneys) is a little low.  This can be due to taking both the Spironolactone and Hydrochlorothiazide.  I recommend discontinuing the Spironolactone and discussing a replacement with your dermatologist.      Your triglycerides are elevated.  This can be due to not completely fasting prior to the lab test.  It can also be due to poor diet, lack of exercise, and alcohol use.  I recommend limiting alcohol.  Let's recheck this at a lab only appointment in 3 months.  If it remains elevated, we may need to increase the Atorvastatin.      Resulted Orders   Basic metabolic panel  (Ca, Cl, CO2, Creat, Gluc, K, Na, BUN)   Result Value Ref Range    Sodium 139 136 - 145 mmol/L    Potassium 4.8 3.5 - 5.0 mmol/L    Chloride 100 98 - 107 mmol/L    Carbon Dioxide (CO2) 26 22 - 31 mmol/L    Anion Gap 13 5 - 18 mmol/L    Urea Nitrogen 26 (H) 8 - 22 mg/dL    Creatinine 1.07 0.60 - 1.10 mg/dL    Calcium 10.2 8.5 - 10.5 mg/dL    Glucose 100 70 - 125 mg/dL    GFR Estimate 54 (L) >60 mL/min/1.73m2      Comment:      As of July 11, 2021, eGFR is calculated by the CKD-EPI creatinine equation, without race adjustment. eGFR can be influenced by muscle mass, exercise, and diet. The reported eGFR is an estimation only and is only applicable if the renal function is stable.   Lipid panel reflex to direct LDL Fasting   Result Value Ref Range    Cholesterol 161 <=199 mg/dL    Triglycerides 243 (H) <=149 mg/dL    Direct Measure HDL 44 (L) >=50 mg/dL      Comment:      HDL Cholesterol Reference Range:     0-2 years:   No reference ranges established for patients under 2 years old  at [facility] for lipid analytes.    2-8 years:  Greater than 45 mg/dL     18 years and older:   Female: Greater than or equal to 50 mg/dL   Male:   Greater than or equal to  40 mg/dL    LDL Cholesterol Calculated 68 <=129 mg/dL    Patient Fasting > 8hrs? Yes        If you have any questions or concerns, please call the clinic at the number listed above.       Sincerely,      ONRRIS Brody CNP
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
## 2021-09-21 ENCOUNTER — TRANSFERRED RECORDS (OUTPATIENT)
Dept: HEALTH INFORMATION MANAGEMENT | Facility: CLINIC | Age: 66
End: 2021-09-21

## 2021-09-21 LAB — COLOGUARD-ABSTRACT: POSITIVE

## 2021-09-27 DIAGNOSIS — R19.5 POSITIVE COLORECTAL CANCER SCREENING USING COLOGUARD TEST: Primary | ICD-10-CM

## 2021-09-28 ENCOUNTER — TELEPHONE (OUTPATIENT)
Dept: FAMILY MEDICINE | Facility: CLINIC | Age: 66
End: 2021-09-28
Payer: COMMERCIAL

## 2021-09-28 NOTE — TELEPHONE ENCOUNTER
----- Message from NORRIS Brody CNP sent at 9/27/2021  8:49 PM CDT -----  Please notify the patient that her cologuard (colon cancer screening) was positive which means that she may have a pre-cancerous or a cancerous colon polyp. I placed an order for a colonoscopy.  Please provide the phone number for [facility].  Thanks.

## 2021-09-28 NOTE — TELEPHONE ENCOUNTER
Patient called back and was informed of results and was given [facility] phone number to schedule colonoscopy

## 2021-10-07 ENCOUNTER — TRANSFERRED RECORDS (OUTPATIENT)
Dept: HEALTH INFORMATION MANAGEMENT | Facility: CLINIC | Age: 66
End: 2021-10-07

## 2021-10-18 DIAGNOSIS — R25.2 CRAMP OF BOTH LOWER EXTREMITIES: ICD-10-CM

## 2021-10-19 RX ORDER — HYDROXYZINE HYDROCHLORIDE 25 MG/1
TABLET, FILM COATED ORAL
Qty: 270 TABLET | Refills: 2 | Status: SHIPPED | OUTPATIENT
Start: 2021-10-19 | End: 2022-07-04

## 2021-10-27 ENCOUNTER — TELEPHONE (OUTPATIENT)
Dept: FAMILY MEDICINE | Facility: CLINIC | Age: 66
End: 2021-10-27
Payer: COMMERCIAL

## 2021-10-27 NOTE — TELEPHONE ENCOUNTER
----- Message from NORRIS Brody CNP sent at 10/26/2021  9:47 PM CDT -----  Please notify the patient that I was reminded of her positive cologuard test.  (She should have been notified over one month ago). I placed a referral for a colonoscopy over one month ago as well.  Please verify that she has completed this.  Thanks.

## 2022-07-04 DIAGNOSIS — R25.2 CRAMP OF BOTH LOWER EXTREMITIES: ICD-10-CM

## 2022-07-04 DIAGNOSIS — R60.9 EDEMA, UNSPECIFIED TYPE: ICD-10-CM

## 2022-07-04 DIAGNOSIS — E78.2 MIXED HYPERLIPIDEMIA: ICD-10-CM

## 2022-07-04 DIAGNOSIS — I10 BENIGN ESSENTIAL HYPERTENSION: ICD-10-CM

## 2022-07-04 NOTE — TELEPHONE ENCOUNTER
"Routing refill request to provider for review/approval because:  Early refill request  Due for visit as of 7/29/22, but nothing scheduled yet.    Last Written Prescription Date:    atorvastatin (LIPITOR) 20 MG tablet 90 tablet 3 7/29/2021  No   Sig - Route: Take 1 tablet (20 mg) by mouth At Bedtime - Oral   Sent to pharmacy as: Atorvastatin Calcium 20 MG Oral Tablet (LIPITOR)   Class: E-Prescribe   Order: [id number]   E-Prescribing Status: Receipt confirmed by pharmacy (7/29/2021  8:17 AM CDT)       hydrochlorothiazide (HYDRODIURIL) 12.5 MG tablet 90 tablet 3 7/29/2021  No   Sig - Route: Take 1 tablet (12.5 mg) by mouth daily - Oral   Sent to pharmacy as: hydroCHLOROthiazide 12.5 MG Oral Tablet (HYDRODIURIL)   Class: E-Prescribe   Order: [id number]   E-Prescribing Status: Receipt confirmed by pharmacy (7/29/2021  8:17 AM CDT)     hydrOXYzine (ATARAX) 25 MG tablet 270 tablet 2 10/19/2021  No   Sig: [HYDROXYZINE HCL (ATARAX) 25 MG TABLET] TAKE 1 TABLET EVERY 8 HOURSAS NEEDED   Sent to pharmacy as: hydrOXYzine HCl 25 MG Oral Tablet (ATARAX)   Class: E-Prescribe   Order: [id number]   E-Prescribing Status: Receipt confirmed by pharmacy (10/19/2021 10:11 PM CDT)       Last office visit provider:  7/29/21     Requested Prescriptions   Pending Prescriptions Disp Refills     hydrOXYzine (ATARAX) 25 MG tablet [Pharmacy Med Name: HYDROXYZ HCL TAB 25MG] 270 tablet 2     Sig: TAKE 1 TABLET EVERY 8 HOURSAS NEEDED       Antihistamines Protocol Passed - 7/4/2022  7:19 AM        Passed - Recent (12 mo) or future (30 days) visit within the authorizing provider's specialty     Patient has had an office visit with the authorizing provider or a provider within the authorizing providers department within the previous 12 mos or has a future within next 30 days. See \"Patient Info\" tab in inbasket, or \"Choose Columns\" in Meds & Orders section of the refill encounter.              Passed - Patient is age 3 or older     Apply age and/or " "weight-based dosing for peds patients age 3 and older.    Forward request to provider for patients under the age of 3.          Passed - Medication is active on med list           atorvastatin (LIPITOR) 20 MG tablet [Pharmacy Med Name: ATORVASTATIN TAB 20MG] 90 tablet 3     Sig: TAKE 1 TABLET AT BEDTIME       Statins Protocol Passed - 7/4/2022  7:19 AM        Passed - LDL on file in past 12 months     Recent Labs   Lab Test 07/29/21  0826   LDL 68             Passed - No abnormal creatine kinase in past 12 months     No lab results found.             Passed - Recent (12 mo) or future (30 days) visit within the authorizing provider's specialty     Patient has had an office visit with the authorizing provider or a provider within the authorizing providers department within the previous 12 mos or has a future within next 30 days. See \"Patient Info\" tab in inbasket, or \"Choose Columns\" in Meds & Orders section of the refill encounter.              Passed - Medication is active on med list        Passed - Patient is age 18 or older        Passed - No active pregnancy on record        Passed - No positive pregnancy test in past 12 months           hydrochlorothiazide (HYDRODIURIL) 12.5 MG tablet [Pharmacy Med Name: HYDROCHLOROT TAB 12.5MG] 90 tablet 3     Sig: TAKE 1 TABLET DAILY       Diuretics (Including Combos) Protocol Passed - 7/4/2022  7:19 AM        Passed - Blood pressure under 140/90 in past 12 months     BP Readings from Last 3 Encounters:   07/29/21 120/76   12/17/20 130/80   12/04/20 (!) 146/90                 Passed - Recent (12 mo) or future (30 days) visit within the authorizing provider's specialty     Patient has had an office visit with the authorizing provider or a provider within the authorizing providers department within the previous 12 mos or has a future within next 30 days. See \"Patient Info\" tab in inbasket, or \"Choose Columns\" in Meds & Orders section of the refill encounter.              Passed " - Medication is active on med list        Passed - Patient is age 18 or older        Passed - No active pregancy on record        Passed - Normal serum creatinine on file in past 12 months     Recent Labs   Lab Test 07/29/21  0826   CR 1.07              Passed - Normal serum potassium on file in past 12 months     Recent Labs   Lab Test 07/29/21  0826   POTASSIUM 4.8                    Passed - Normal serum sodium on file in past 12 months     Recent Labs   Lab Test 07/29/21  0826                 Passed - No positive pregnancy test in past 12 months             Kelsey Hanks RN 07/04/22 4:03 PM

## 2022-07-05 RX ORDER — ATORVASTATIN CALCIUM 20 MG/1
TABLET, FILM COATED ORAL
Qty: 90 TABLET | Refills: 0 | Status: SHIPPED | OUTPATIENT
Start: 2022-07-05 | End: 2022-10-03

## 2022-07-05 RX ORDER — HYDROCHLOROTHIAZIDE 12.5 MG/1
TABLET ORAL
Qty: 90 TABLET | Refills: 0 | Status: SHIPPED | OUTPATIENT
Start: 2022-07-05 | End: 2022-10-03

## 2022-07-05 RX ORDER — HYDROXYZINE HYDROCHLORIDE 25 MG/1
TABLET, FILM COATED ORAL
Qty: 270 TABLET | Refills: 0 | Status: SHIPPED | OUTPATIENT
Start: 2022-07-05 | End: 2022-10-03

## 2022-07-15 ENCOUNTER — TRANSFERRED RECORDS (OUTPATIENT)
Dept: HEALTH INFORMATION MANAGEMENT | Facility: CLINIC | Age: 67
End: 2022-07-15

## 2022-07-27 ENCOUNTER — TRANSFERRED RECORDS (OUTPATIENT)
Dept: HEALTH INFORMATION MANAGEMENT | Facility: CLINIC | Age: 67
End: 2022-07-27

## 2022-10-27 ENCOUNTER — TELEPHONE (OUTPATIENT)
Dept: FAMILY MEDICINE | Facility: CLINIC | Age: 67
End: 2022-10-27

## 2022-10-27 ENCOUNTER — OFFICE VISIT (OUTPATIENT)
Dept: FAMILY MEDICINE | Facility: CLINIC | Age: 67
End: 2022-10-27
Payer: COMMERCIAL

## 2022-10-27 VITALS
WEIGHT: 248.1 LBS | OXYGEN SATURATION: 98 % | SYSTOLIC BLOOD PRESSURE: 120 MMHG | DIASTOLIC BLOOD PRESSURE: 60 MMHG | RESPIRATION RATE: 16 BRPM | HEART RATE: 53 BPM | BODY MASS INDEX: 37.72 KG/M2

## 2022-10-27 DIAGNOSIS — Z23 HIGH PRIORITY FOR 2019-NCOV VACCINE: ICD-10-CM

## 2022-10-27 DIAGNOSIS — E66.01 MORBID OBESITY (H): ICD-10-CM

## 2022-10-27 DIAGNOSIS — I10 BENIGN ESSENTIAL HYPERTENSION: Primary | ICD-10-CM

## 2022-10-27 DIAGNOSIS — R25.2 LEG CRAMPS: ICD-10-CM

## 2022-10-27 DIAGNOSIS — E78.2 MIXED HYPERLIPIDEMIA: ICD-10-CM

## 2022-10-27 DIAGNOSIS — Z79.899 MEDICATION MANAGEMENT: ICD-10-CM

## 2022-10-27 DIAGNOSIS — R60.9 EDEMA, UNSPECIFIED TYPE: ICD-10-CM

## 2022-10-27 LAB
ALBUMIN SERPL BCG-MCNC: 4.5 G/DL (ref 3.5–5.2)
ALP SERPL-CCNC: 92 U/L (ref 35–104)
ALT SERPL W P-5'-P-CCNC: 23 U/L (ref 10–35)
ANION GAP SERPL CALCULATED.3IONS-SCNC: 12 MMOL/L (ref 7–15)
AST SERPL W P-5'-P-CCNC: 27 U/L (ref 10–35)
BILIRUB SERPL-MCNC: 0.4 MG/DL
BUN SERPL-MCNC: 21.2 MG/DL (ref 8–23)
CALCIUM SERPL-MCNC: 10 MG/DL (ref 8.8–10.2)
CHLORIDE SERPL-SCNC: 102 MMOL/L (ref 98–107)
CHOLEST SERPL-MCNC: 158 MG/DL
CK SERPL-CCNC: 104 U/L (ref 26–192)
CREAT SERPL-MCNC: 0.8 MG/DL (ref 0.51–0.95)
DEPRECATED HCO3 PLAS-SCNC: 28 MMOL/L (ref 22–29)
GFR SERPL CREATININE-BSD FRML MDRD: 80 ML/MIN/1.73M2
GLUCOSE SERPL-MCNC: 98 MG/DL (ref 70–99)
HDLC SERPL-MCNC: 48 MG/DL
LDLC SERPL CALC-MCNC: 83 MG/DL
MAGNESIUM SERPL-MCNC: 2.1 MG/DL (ref 1.7–2.3)
NONHDLC SERPL-MCNC: 110 MG/DL
POTASSIUM SERPL-SCNC: 4.4 MMOL/L (ref 3.4–5.3)
PROT SERPL-MCNC: 7.1 G/DL (ref 6.4–8.3)
SODIUM SERPL-SCNC: 142 MMOL/L (ref 136–145)
TRIGL SERPL-MCNC: 136 MG/DL

## 2022-10-27 PROCEDURE — 90472 IMMUNIZATION ADMIN EACH ADD: CPT | Performed by: NURSE PRACTITIONER

## 2022-10-27 PROCEDURE — 90677 PCV20 VACCINE IM: CPT | Performed by: NURSE PRACTITIONER

## 2022-10-27 PROCEDURE — 80053 COMPREHEN METABOLIC PANEL: CPT | Performed by: NURSE PRACTITIONER

## 2022-10-27 PROCEDURE — 90471 IMMUNIZATION ADMIN: CPT | Performed by: NURSE PRACTITIONER

## 2022-10-27 PROCEDURE — 80061 LIPID PANEL: CPT | Performed by: NURSE PRACTITIONER

## 2022-10-27 PROCEDURE — 82550 ASSAY OF CK (CPK): CPT | Performed by: NURSE PRACTITIONER

## 2022-10-27 PROCEDURE — 91312 COVID-19,PF,PFIZER BOOSTER BIVALENT: CPT | Performed by: NURSE PRACTITIONER

## 2022-10-27 PROCEDURE — 36415 COLL VENOUS BLD VENIPUNCTURE: CPT | Performed by: NURSE PRACTITIONER

## 2022-10-27 PROCEDURE — 99214 OFFICE O/P EST MOD 30 MIN: CPT | Mod: 25 | Performed by: NURSE PRACTITIONER

## 2022-10-27 PROCEDURE — 83735 ASSAY OF MAGNESIUM: CPT | Performed by: NURSE PRACTITIONER

## 2022-10-27 PROCEDURE — 0124A COVID-19,PF,PFIZER BOOSTER BIVALENT: CPT | Performed by: NURSE PRACTITIONER

## 2022-10-27 PROCEDURE — 90662 IIV NO PRSV INCREASED AG IM: CPT | Performed by: NURSE PRACTITIONER

## 2022-10-27 RX ORDER — HYDROCHLOROTHIAZIDE 12.5 MG/1
12.5 TABLET ORAL DAILY
Qty: 90 TABLET | Refills: 3 | Status: SHIPPED | OUTPATIENT
Start: 2022-10-27 | End: 2022-10-31

## 2022-10-27 RX ORDER — ATORVASTATIN CALCIUM 20 MG/1
20 TABLET, FILM COATED ORAL AT BEDTIME
Qty: 90 TABLET | Refills: 3 | Status: SHIPPED | OUTPATIENT
Start: 2022-10-27 | End: 2022-10-31

## 2022-10-27 RX ORDER — TRAMADOL HYDROCHLORIDE 50 MG/1
50-100 TABLET ORAL EVERY 6 HOURS PRN
Qty: 20 TABLET | Refills: 0 | Status: SHIPPED | OUTPATIENT
Start: 2022-10-27 | End: 2022-12-08

## 2022-10-27 ASSESSMENT — PAIN SCALES - GENERAL: PAINLEVEL: NO PAIN (0)

## 2022-10-27 NOTE — PROGRESS NOTES
Assessment and Plan:     Benign essential hypertension  Blood pressure is controlled.  She continues hydrochlorothiazide.  - hydrochlorothiazide (HYDRODIURIL) 12.5 MG tablet  Dispense: 90 tablet; Refill: 3    Edema, unspecified type  Edema is controlled.  - hydrochlorothiazide (HYDRODIURIL) 12.5 MG tablet  Dispense: 90 tablet; Refill: 3    Mixed hyperlipidemia  We will check lipid cascade.  She continues a atorvastatin.  - Lipid panel reflex to direct LDL Fasting  - atorvastatin (LIPITOR) 20 MG tablet  Dispense: 90 tablet; Refill: 3    Leg cramps  Patient has a history of surgery in the area.  We will rule out hypomagnesemia and elevated CK due to taking a statin.  I prescribed tramadol to use sparingly.  She is to avoid taking this with other sedatives.  She is aware that this medication is addictive and habit-forming.  - Magnesium  - CK total  - traMADol (ULTRAM) 50 MG tablet  Dispense: 20 tablet; Refill: 0    Morbid obesity (H)  Recommend consuming a healthy diet and exercising.  This is contributing to her hypertension and hyperlipidemia.    High priority for 2019-nCoV vaccine  - COVID-19,PF,PFIZER BOOSTER BIVALENT 12+Yrs    Medication management  - Comprehensive metabolic panel (BMP + Alb, Alk Phos, ALT, AST, Total. Bili, TP)    She declines hepatitis C screening and bone density scan.  She has an appointment scheduled for mammogram through her gynecologist.  I encouraged her to fax the records.    Subjective:     Akanksha is a 67 year old female presenting to the clinic for medication management.  Patient has hypertension which is controlled with hydrochlorothiazide 12.5 mg daily.  She does not monitor her blood pressure outside of clinic.  She is consuming a healthy diet.  She rides a bike for exercise.  She denies headaches, blurry vision, chest pain, shortness of breath with exertion, edema, orthopnea, syncope.  Patient is taking atorvastatin 20 mg daily for lipid management.  Last cholesterol check was on  7/29/2021 with a total cholesterol 161, triglycerides 243, HDL 44, LDL 68.  Patient requests renewal of tramadol.  She takes this sparingly for lower extremity cramping.  She has a history of surgery within her left lower extremity.  Patient was prescribed tramadol by her orthopedic specialist for the cramping.  This past summer, she had cramping which lasted for 1 week.  She developed bursitis within her left hip.  It required 2 rounds of steroids.  Patient took tramadol at that time.    Reviewof Systems: A complete 14 point review of systems was obtained and is negative or as stated in the history of present illness.    Social History     Socioeconomic History     Marital status: Single     Spouse name: Not on file     Number of children: Not on file     Years of education: Not on file     Highest education level: Not on file   Occupational History     Not on file   Tobacco Use     Smoking status: Never     Smokeless tobacco: Never   Substance and Sexual Activity     Alcohol use: Yes     Comment: Alcoholic Drinks/day: rare     Drug use: No     Sexual activity: Not on file     Comment: single    Other Topics Concern     Not on file   Social History Narrative     Not on file     Social Determinants of Health     Financial Resource Strain: Not on file   Food Insecurity: Not on file   Transportation Needs: Not on file   Physical Activity: Not on file   Stress: Not on file   Social Connections: Not on file   Intimate Partner Violence: Not on file   Housing Stability: Not on file       Active Ambulatory Problems     Diagnosis Date Noted     Diverticulosis      Fatty Liver      Obesity      Osteoarthritis Of The Knee      Essential Hypercholesterolemia      Sinus Bradycardia      Hyperlipidemia 08/06/2012     Menopause 08/17/2017     Obesity (BMI 35.0-39.9) with comorbidity (H) 07/11/2019     Resolved Ambulatory Problems     Diagnosis Date Noted     No Resolved Ambulatory Problems     No Additional Past Medical History        Family History   Problem Relation Age of Onset     Pacemaker Mother      Heart Disease Father      Hypertension Father      Diabetes Brother      Hypertension Brother      Hypertension Brother        Objective:     /60 (BP Location: Right arm, Patient Position: Sitting, Cuff Size: Adult Regular)   Pulse 53   Resp 16   Wt 112.5 kg (248 lb 1.6 oz)   SpO2 98%   BMI 37.72 kg/m      Patient is alert, in no obvious distress.   Skin: Warm, dry.    Neck: Supple, no lymphadenopathy, JVD, bruits noted.  No thyromegaly.  Lungs:  Clear to auscultation. Respirations even and unlabored.  No wheezing or rales noted.   Heart:  Regular rate and rhythm.  No murmurs.   Musculoskeletal:  Full ROM of extremities.  No edema is present in bilateral lower extremities.

## 2022-10-27 NOTE — TELEPHONE ENCOUNTER
Pt calling to request that meds be sent to [facility] and not [facility]. Please pull from [facility] and resubmit to [facility].

## 2022-10-27 NOTE — LETTER
October 28, 2022      Akanksha Cohn  [address]  [place] [address]        Dear ,    We are writing to inform you of your test results.    Your kidney and liver tests were normal.    Your CK and magnesium are normal.     Your HDL (good cholesterol) is low.  You can increase this by consuming a healthy diet (olive oil and purple produce) and exercising. I recommend continuing the Atorvastatin.       Resulted Orders   Comprehensive metabolic panel (BMP + Alb, Alk Phos, ALT, AST, Total. Bili, TP)   Result Value Ref Range    Sodium 142 136 - 145 mmol/L    Potassium 4.4 3.4 - 5.3 mmol/L    Chloride 102 98 - 107 mmol/L    Carbon Dioxide (CO2) 28 22 - 29 mmol/L    Anion Gap 12 7 - 15 mmol/L    Urea Nitrogen 21.2 8.0 - 23.0 mg/dL    Creatinine 0.80 0.51 - 0.95 mg/dL    Calcium 10.0 8.8 - 10.2 mg/dL    Glucose 98 70 - 99 mg/dL    Alkaline Phosphatase 92 35 - 104 U/L    AST 27 10 - 35 U/L    ALT 23 10 - 35 U/L    Protein Total 7.1 6.4 - 8.3 g/dL    Albumin 4.5 3.5 - 5.2 g/dL    Bilirubin Total 0.4 <=1.2 mg/dL    GFR Estimate 80 >60 mL/min/1.73m2      Comment:      Effective December 21, 2021 eGFRcr in adults is calculated using the 2021 CKD-EPI creatinine equation which includes age and gender (Rasheeda et al., NEJ, DOI: 10.1056/BTOOqw4232655)   Lipid panel reflex to direct LDL Fasting   Result Value Ref Range    Cholesterol 158 <200 mg/dL    Triglycerides 136 <150 mg/dL    Direct Measure HDL 48 (L) >=50 mg/dL    LDL Cholesterol Calculated 83 <=100 mg/dL    Non HDL Cholesterol 110 <130 mg/dL    Narrative    Cholesterol  Desirable:  <200 mg/dL    Triglycerides  Normal:  Less than 150 mg/dL  Borderline High:  150-199 mg/dL  High:  200-499 mg/dL  Very High:  Greater than or equal to 500 mg/dL    Direct Measure HDL  Female:  Greater than or equal to 50 mg/dL   Male:  Greater than or equal to 40 mg/dL    LDL Cholesterol  Desirable:  <100mg/dL  Above Desirable:  100-129 mg/dL   Borderline High:  130-159 mg/dL    High:  160-189 mg/dL   Very High:  >= 190 mg/dL    Non HDL Cholesterol  Desirable:  130 mg/dL  Above Desirable:  130-159 mg/dL  Borderline High:  160-189 mg/dL  High:  190-219 mg/dL  Very High:  Greater than or equal to 220 mg/dL   Magnesium   Result Value Ref Range    Magnesium 2.1 1.7 - 2.3 mg/dL   CK total   Result Value Ref Range     26 - 192 U/L       If you have any questions or concerns, please call the clinic at the number listed above.       Sincerely,      NORRIS Brody CNP

## 2022-10-30 DIAGNOSIS — I10 BENIGN ESSENTIAL HYPERTENSION: ICD-10-CM

## 2022-10-30 DIAGNOSIS — E78.2 MIXED HYPERLIPIDEMIA: ICD-10-CM

## 2022-10-30 DIAGNOSIS — R60.9 EDEMA, UNSPECIFIED TYPE: ICD-10-CM

## 2022-10-31 RX ORDER — HYDROCHLOROTHIAZIDE 12.5 MG/1
12.5 TABLET ORAL DAILY
Qty: 90 TABLET | Refills: 3 | Status: SHIPPED | OUTPATIENT
Start: 2022-10-31 | End: 2023-11-06

## 2022-10-31 RX ORDER — ATORVASTATIN CALCIUM 20 MG/1
20 TABLET, FILM COATED ORAL AT BEDTIME
Qty: 90 TABLET | Refills: 3 | Status: SHIPPED | OUTPATIENT
Start: 2022-10-31 | End: 2023-11-06

## 2022-11-20 ENCOUNTER — HEALTH MAINTENANCE LETTER (OUTPATIENT)
Age: 67
End: 2022-11-20

## 2022-12-07 DIAGNOSIS — R25.2 LEG CRAMPS: ICD-10-CM

## 2022-12-07 NOTE — TELEPHONE ENCOUNTER
Medication last filled: 10/27/2022    Last clinic visit: 10/27/2022    Clinic visit frequency required: Q 3 months    Next clinic visit: N/A    Pending Prescriptions:                       Disp   Refills    traMADol (ULTRAM) 50 MG tablet            20 tab*0            Sig: Take 1-2 tablets ( mg) by mouth every 6           hours as needed

## 2022-12-08 RX ORDER — TRAMADOL HYDROCHLORIDE 50 MG/1
50-100 TABLET ORAL EVERY 6 HOURS PRN
Qty: 20 TABLET | Refills: 0 | Status: SHIPPED | OUTPATIENT
Start: 2022-12-08

## 2023-11-16 PROBLEM — K76.0 FATTY LIVER: Status: ACTIVE | Noted: 2023-11-16

## 2023-11-16 PROBLEM — K63.5 POLYP OF COLON: Status: ACTIVE | Noted: 2021-10-07

## 2023-11-16 PROBLEM — R00.1 SINUS BRADYCARDIA: Status: ACTIVE | Noted: 2023-11-16

## 2023-11-16 PROBLEM — D12.2 BENIGN NEOPLASM OF ASCENDING COLON: Status: ACTIVE | Noted: 2021-10-11

## 2023-11-16 PROBLEM — M17.9 OSTEOARTHRITIS OF KNEE, UNSPECIFIED LATERALITY, UNSPECIFIED OSTEOARTHRITIS TYPE: Status: ACTIVE | Noted: 2023-11-16

## 2023-11-17 ENCOUNTER — OFFICE VISIT (OUTPATIENT)
Dept: FAMILY MEDICINE | Facility: CLINIC | Age: 68
End: 2023-11-17
Payer: COMMERCIAL

## 2023-11-17 VITALS
TEMPERATURE: 97.6 F | OXYGEN SATURATION: 99 % | HEIGHT: 67 IN | HEART RATE: 53 BPM | DIASTOLIC BLOOD PRESSURE: 72 MMHG | RESPIRATION RATE: 16 BRPM | WEIGHT: 247.3 LBS | BODY MASS INDEX: 38.81 KG/M2 | SYSTOLIC BLOOD PRESSURE: 136 MMHG

## 2023-11-17 DIAGNOSIS — R60.9 EDEMA, UNSPECIFIED TYPE: ICD-10-CM

## 2023-11-17 DIAGNOSIS — M79.89 SOFT TISSUE MASS: Primary | ICD-10-CM

## 2023-11-17 DIAGNOSIS — Z12.31 VISIT FOR SCREENING MAMMOGRAM: ICD-10-CM

## 2023-11-17 DIAGNOSIS — I10 BENIGN ESSENTIAL HYPERTENSION: ICD-10-CM

## 2023-11-17 DIAGNOSIS — E66.01 MORBID OBESITY (H): ICD-10-CM

## 2023-11-17 DIAGNOSIS — E78.2 MIXED HYPERLIPIDEMIA: ICD-10-CM

## 2023-11-17 DIAGNOSIS — Z79.899 MEDICATION MANAGEMENT: ICD-10-CM

## 2023-11-17 LAB
ALBUMIN SERPL BCG-MCNC: 4.6 G/DL (ref 3.5–5.2)
ALP SERPL-CCNC: 94 U/L (ref 40–150)
ALT SERPL W P-5'-P-CCNC: 35 U/L (ref 0–50)
ANION GAP SERPL CALCULATED.3IONS-SCNC: 12 MMOL/L (ref 7–15)
AST SERPL W P-5'-P-CCNC: 35 U/L (ref 0–45)
BILIRUB SERPL-MCNC: 0.6 MG/DL
BUN SERPL-MCNC: 21.2 MG/DL (ref 8–23)
CALCIUM SERPL-MCNC: 9.7 MG/DL (ref 8.8–10.2)
CHLORIDE SERPL-SCNC: 100 MMOL/L (ref 98–107)
CHOLEST SERPL-MCNC: 135 MG/DL
CREAT SERPL-MCNC: 0.73 MG/DL (ref 0.51–0.95)
DEPRECATED HCO3 PLAS-SCNC: 28 MMOL/L (ref 22–29)
EGFRCR SERPLBLD CKD-EPI 2021: 89 ML/MIN/1.73M2
ERYTHROCYTE [DISTWIDTH] IN BLOOD BY AUTOMATED COUNT: 12.4 % (ref 10–15)
GLUCOSE SERPL-MCNC: 86 MG/DL (ref 70–99)
HCT VFR BLD AUTO: 41.6 % (ref 35–47)
HDLC SERPL-MCNC: 44 MG/DL
HGB BLD-MCNC: 14.1 G/DL (ref 11.7–15.7)
LDLC SERPL CALC-MCNC: 58 MG/DL
MCH RBC QN AUTO: 29.9 PG (ref 26.5–33)
MCHC RBC AUTO-ENTMCNC: 33.9 G/DL (ref 31.5–36.5)
MCV RBC AUTO: 88 FL (ref 78–100)
NONHDLC SERPL-MCNC: 91 MG/DL
PLATELET # BLD AUTO: 205 10E3/UL (ref 150–450)
POTASSIUM SERPL-SCNC: 3.8 MMOL/L (ref 3.4–5.3)
PROT SERPL-MCNC: 7.3 G/DL (ref 6.4–8.3)
RBC # BLD AUTO: 4.72 10E6/UL (ref 3.8–5.2)
SODIUM SERPL-SCNC: 140 MMOL/L (ref 135–145)
TRIGL SERPL-MCNC: 166 MG/DL
WBC # BLD AUTO: 6.4 10E3/UL (ref 4–11)

## 2023-11-17 PROCEDURE — 85027 COMPLETE CBC AUTOMATED: CPT | Performed by: NURSE PRACTITIONER

## 2023-11-17 PROCEDURE — 99214 OFFICE O/P EST MOD 30 MIN: CPT | Mod: 25 | Performed by: NURSE PRACTITIONER

## 2023-11-17 PROCEDURE — 90662 IIV NO PRSV INCREASED AG IM: CPT | Performed by: NURSE PRACTITIONER

## 2023-11-17 PROCEDURE — 80053 COMPREHEN METABOLIC PANEL: CPT | Performed by: NURSE PRACTITIONER

## 2023-11-17 PROCEDURE — 90480 ADMN SARSCOV2 VAC 1/ONLY CMP: CPT | Performed by: NURSE PRACTITIONER

## 2023-11-17 PROCEDURE — 80061 LIPID PANEL: CPT | Performed by: NURSE PRACTITIONER

## 2023-11-17 PROCEDURE — 91320 SARSCV2 VAC 30MCG TRS-SUC IM: CPT | Performed by: NURSE PRACTITIONER

## 2023-11-17 PROCEDURE — 90471 IMMUNIZATION ADMIN: CPT | Performed by: NURSE PRACTITIONER

## 2023-11-17 PROCEDURE — 36415 COLL VENOUS BLD VENIPUNCTURE: CPT | Performed by: NURSE PRACTITIONER

## 2023-11-17 RX ORDER — ATORVASTATIN CALCIUM 20 MG/1
20 TABLET, FILM COATED ORAL AT BEDTIME
Qty: 90 TABLET | Refills: 3 | Status: SHIPPED | OUTPATIENT
Start: 2023-11-17

## 2023-11-17 RX ORDER — HYDROCHLOROTHIAZIDE 25 MG/1
25 TABLET ORAL DAILY
Qty: 90 TABLET | Refills: 0 | Status: SHIPPED | OUTPATIENT
Start: 2023-11-17 | End: 2024-02-05

## 2023-11-17 ASSESSMENT — PAIN SCALES - GENERAL: PAINLEVEL: NO PAIN (0)

## 2023-11-17 NOTE — PROGRESS NOTES
Assessment and Plan:     Soft tissue mass  We will obtain ultrasound for further evaluation.  Differentials include lipoma, cyst, lymph node enlargement.  We will check hemogram.  - US Head Neck Soft Tissue  - CBC with platelets  - CBC with platelets    Benign essential hypertension  Edema, unspecified type  Blood pressure improved upon recheck.  Patient request increase of hydrochlorothiazide due to lower extremity edema.  Recommend she monitor her blood pressure outside the clinic.  We will recheck BMP in 2 weeks.  - hydrochlorothiazide (HYDRODIURIL) 25 MG tablet  Dispense: 90 tablet; Refill: 0    Mixed hyperlipidemia  We will check lipid cascade.  She continues atorvastatin.  - Lipid panel reflex to direct LDL Fasting  - Lipid panel reflex to direct LDL Fasting  - atorvastatin (LIPITOR) 20 MG tablet  Dispense: 90 tablet; Refill: 3    Morbid obesity (H)  Recommend consuming a healthy diet and exercising.  This is contributing to hyperlipidemia and hypertension.    Visit for screening mammogram  - MA SCREENING DIGITAL BILAT - Future  (s+30)    Medication management  - Comprehensive metabolic panel (BMP + Alb, Alk Phos, ALT, AST, Total. Bili, TP)  - Comprehensive metabolic panel (BMP + Alb, Alk Phos, ALT, AST, Total. Bili, TP)        Subjective:     Akanksha is a 68 year old female presenting to the clinic for medication management.  Patient has hypertension and lower extremity edema.  She is taking hydrochlorothiazide 12.5 mg daily.  Patient is interested in increasing the dose.  Patient is working a job where she sits for most of the day.  She has noticed some more swelling around her ankles.  She denies headaches, blurry vision, chest pain, shortness of breath with exertion, orthopnea, syncope.  She does not monitor her blood pressure outside the clinic.  She was added stress as her significant other has dementia and her mother has declining health.  Patient is also caring for her new dog.  She takes atorvastatin  for lipid management.  Patient complains of a soft tissue mass within her left anterior neck.  She has had this for multiple years.  She is unsure if it has increased in size.  Her dentist has expressed concerns about this.  She denies any unintentional weight loss, night sweats, fever.  She has history of surgery within her left lower extremity.  She was prescribed tramadol by orthopedic specialist for the cramping.  Patient takes this sparingly.      Reviewof Systems: A complete 14 point review of systems was obtained and is negative or as stated in the history of present illness.    Social History     Socioeconomic History    Marital status: Single     Spouse name: Not on file    Number of children: Not on file    Years of education: Not on file    Highest education level: Not on file   Occupational History    Not on file   Tobacco Use    Smoking status: Never     Passive exposure: Never    Smokeless tobacco: Never   Vaping Use    Vaping Use: Never used   Substance and Sexual Activity    Alcohol use: Yes     Comment: Alcoholic Drinks/day: rare    Drug use: No    Sexual activity: Not on file     Comment: single    Other Topics Concern    Not on file   Social History Narrative    Not on file     Social Determinants of Health     Financial Resource Strain: Low Risk  (11/17/2023)    Financial Resource Strain     Within the past 12 months, have you or your family members you live with been unable to get utilities (heat, electricity) when it was really needed?: No   Food Insecurity: Low Risk  (11/17/2023)    Food Insecurity     Within the past 12 months, did you worry that your food would run out before you got money to buy more?: No     Within the past 12 months, did the food you bought just not last and you didn t have money to get more?: No   Transportation Needs: Low Risk  (11/17/2023)    Transportation Needs     Within the past 12 months, has lack of transportation kept you from medical appointments, getting your  "medicines, non-medical meetings or appointments, work, or from getting things that you need?: No   Physical Activity: Not on file   Stress: Not on file   Social Connections: Not on file   Interpersonal Safety: Low Risk  (11/17/2023)    Interpersonal Safety     Do you feel physically and emotionally safe where you currently live?: Yes     Within the past 12 months, have you been hit, slapped, kicked or otherwise physically hurt by someone?: No     Within the past 12 months, have you been humiliated or emotionally abused in other ways by your partner or ex-partner?: No   Housing Stability: Low Risk  (11/17/2023)    Housing Stability     Do you have housing? : Yes     Are you worried about losing your housing?: No       Active Ambulatory Problems     Diagnosis Date Noted    Diverticulosis     Fatty Liver     Obesity     Osteoarthritis Of The Knee     Essential Hypercholesterolemia     Sinus Bradycardia     Hyperlipidemia 08/06/2012    Menopause 08/17/2017    Obesity (BMI 35.0-39.9) with comorbidity (H) 07/11/2019    Fatty liver 11/16/2023    Osteoarthritis of knee, unspecified laterality, unspecified osteoarthritis type 11/16/2023    Sinus bradycardia 11/16/2023    Benign neoplasm of ascending colon 10/11/2021    Polyp of colon 10/07/2021     Resolved Ambulatory Problems     Diagnosis Date Noted    No Resolved Ambulatory Problems     No Additional Past Medical History       Family History   Problem Relation Age of Onset    Pacemaker Mother     Heart Disease Father     Hypertension Father     Diabetes Brother     Hypertension Brother     Hypertension Brother        Objective:     /72   Pulse 53   Temp 97.6  F (36.4  C)   Resp 16   Ht 1.702 m (5' 7\")   Wt 112.2 kg (247 lb 4.8 oz)   SpO2 99%   BMI 38.73 kg/m      Patient is alert, in no obvious distress.   Skin: Warm, dry.   Neck: Supple, no lymphadenopathy. No thyromegaly. Golf ball soft tissue mass noted within her left anterior neck slightly above her left " clavicle.   Lungs:  Clear to auscultation. Respirations even and unlabored.  No wheezing or rales noted.   Heart:  Regular rate and rhythm.  No murmurs.   Musculoskeletal:  +1 nonpitting edema is present in bilateral lower extremities.         Answers submitted by the patient for this visit:  General Questionnaire (Submitted on 11/17/2023)  Chief Complaint: Chronic problems general questions HPI Form  What is the reason for your visit today? : perscription refill and flu covid shots  How many servings of fruits and vegetables do you eat daily?: 2-3  On average, how many sweetened beverages do you drink each day (Examples: soda, juice, sweet tea, etc.  Do NOT count diet or artificially sweetened beverages)?: 1  How many minutes a day do you exercise enough to make your heart beat faster?: 30 to 60  How many days a week do you exercise enough to make your heart beat faster?: 6  How many days per week do you miss taking your medication?: 0

## 2023-11-25 ENCOUNTER — HEALTH MAINTENANCE LETTER (OUTPATIENT)
Age: 68
End: 2023-11-25

## 2024-01-28 DIAGNOSIS — R60.9 EDEMA, UNSPECIFIED TYPE: ICD-10-CM

## 2024-01-29 RX ORDER — HYDROCHLOROTHIAZIDE 25 MG/1
25 TABLET ORAL DAILY
Qty: 90 TABLET | Refills: 0 | OUTPATIENT
Start: 2024-01-29

## 2024-02-05 ENCOUNTER — TELEPHONE (OUTPATIENT)
Dept: FAMILY MEDICINE | Facility: CLINIC | Age: 69
End: 2024-02-05

## 2024-02-05 ENCOUNTER — LAB (OUTPATIENT)
Dept: LAB | Facility: CLINIC | Age: 69
End: 2024-02-05
Payer: COMMERCIAL

## 2024-02-05 DIAGNOSIS — Z79.899 MEDICATION MANAGEMENT: Primary | ICD-10-CM

## 2024-02-05 DIAGNOSIS — Z79.899 MEDICATION MANAGEMENT: ICD-10-CM

## 2024-02-05 DIAGNOSIS — R60.9 EDEMA, UNSPECIFIED TYPE: ICD-10-CM

## 2024-02-05 LAB
ANION GAP SERPL CALCULATED.3IONS-SCNC: 9 MMOL/L (ref 7–15)
BUN SERPL-MCNC: 27.1 MG/DL (ref 8–23)
CALCIUM SERPL-MCNC: 9.5 MG/DL (ref 8.8–10.2)
CHLORIDE SERPL-SCNC: 101 MMOL/L (ref 98–107)
CREAT SERPL-MCNC: 0.75 MG/DL (ref 0.51–0.95)
DEPRECATED HCO3 PLAS-SCNC: 31 MMOL/L (ref 22–29)
EGFRCR SERPLBLD CKD-EPI 2021: 86 ML/MIN/1.73M2
GLUCOSE SERPL-MCNC: 98 MG/DL (ref 70–99)
POTASSIUM SERPL-SCNC: 4.1 MMOL/L (ref 3.4–5.3)
SODIUM SERPL-SCNC: 141 MMOL/L (ref 135–145)

## 2024-02-05 PROCEDURE — 80048 BASIC METABOLIC PNL TOTAL CA: CPT

## 2024-02-05 PROCEDURE — 36415 COLL VENOUS BLD VENIPUNCTURE: CPT

## 2024-02-05 RX ORDER — HYDROCHLOROTHIAZIDE 25 MG/1
25 TABLET ORAL DAILY
Qty: 90 TABLET | Refills: 3 | Status: SHIPPED | OUTPATIENT
Start: 2024-02-05

## 2024-09-05 ENCOUNTER — TRANSFERRED RECORDS (OUTPATIENT)
Dept: MULTI SPECIALTY CLINIC | Facility: CLINIC | Age: 69
End: 2024-09-05

## 2024-11-03 ENCOUNTER — HEALTH MAINTENANCE LETTER (OUTPATIENT)
Age: 69
End: 2024-11-03

## 2024-11-19 DIAGNOSIS — R60.9 EDEMA, UNSPECIFIED TYPE: ICD-10-CM

## 2024-11-20 RX ORDER — HYDROCHLOROTHIAZIDE 25 MG/1
25 TABLET ORAL DAILY
Qty: 90 TABLET | Refills: 3 | OUTPATIENT
Start: 2024-11-20

## 2024-12-01 DIAGNOSIS — E78.2 MIXED HYPERLIPIDEMIA: ICD-10-CM

## 2024-12-02 RX ORDER — ATORVASTATIN CALCIUM 20 MG/1
20 TABLET, FILM COATED ORAL AT BEDTIME
Qty: 90 TABLET | Refills: 3 | Status: SHIPPED | OUTPATIENT
Start: 2024-12-02

## 2024-12-12 ENCOUNTER — OFFICE VISIT (OUTPATIENT)
Dept: FAMILY MEDICINE | Facility: CLINIC | Age: 69
End: 2024-12-12
Payer: COMMERCIAL

## 2024-12-12 VITALS
BODY MASS INDEX: 35.77 KG/M2 | DIASTOLIC BLOOD PRESSURE: 79 MMHG | HEIGHT: 68 IN | HEART RATE: 53 BPM | RESPIRATION RATE: 14 BRPM | SYSTOLIC BLOOD PRESSURE: 129 MMHG | OXYGEN SATURATION: 99 % | WEIGHT: 236 LBS | TEMPERATURE: 97.5 F

## 2024-12-12 DIAGNOSIS — E66.01 CLASS 2 SEVERE OBESITY WITH SERIOUS COMORBIDITY AND BODY MASS INDEX (BMI) OF 36.0 TO 36.9 IN ADULT, UNSPECIFIED OBESITY TYPE (H): ICD-10-CM

## 2024-12-12 DIAGNOSIS — Z79.899 MEDICATION MANAGEMENT: ICD-10-CM

## 2024-12-12 DIAGNOSIS — R60.9 EDEMA, UNSPECIFIED TYPE: ICD-10-CM

## 2024-12-12 DIAGNOSIS — Z00.00 ENCOUNTER FOR MEDICARE ANNUAL WELLNESS EXAM: Primary | ICD-10-CM

## 2024-12-12 DIAGNOSIS — E66.812 CLASS 2 SEVERE OBESITY WITH SERIOUS COMORBIDITY AND BODY MASS INDEX (BMI) OF 36.0 TO 36.9 IN ADULT, UNSPECIFIED OBESITY TYPE (H): ICD-10-CM

## 2024-12-12 DIAGNOSIS — Z13.1 DIABETES MELLITUS SCREENING: ICD-10-CM

## 2024-12-12 DIAGNOSIS — E78.2 MIXED HYPERLIPIDEMIA: ICD-10-CM

## 2024-12-12 PROBLEM — R73.03 PREDIABETES: Status: ACTIVE | Noted: 2024-12-12

## 2024-12-12 LAB
ALBUMIN SERPL BCG-MCNC: 4.5 G/DL (ref 3.5–5.2)
ALP SERPL-CCNC: 98 U/L (ref 40–150)
ALT SERPL W P-5'-P-CCNC: 18 U/L (ref 0–50)
AST SERPL W P-5'-P-CCNC: 19 U/L (ref 0–45)
BILIRUB DIRECT SERPL-MCNC: <0.2 MG/DL (ref 0–0.3)
BILIRUB SERPL-MCNC: 0.5 MG/DL
CHOLEST SERPL-MCNC: 131 MG/DL
ERYTHROCYTE [DISTWIDTH] IN BLOOD BY AUTOMATED COUNT: 12.3 % (ref 10–15)
EST. AVERAGE GLUCOSE BLD GHB EST-MCNC: 117 MG/DL
FASTING STATUS PATIENT QL REPORTED: ABNORMAL
HBA1C MFR BLD: 5.7 % (ref 0–5.6)
HCT VFR BLD AUTO: 41.3 % (ref 35–47)
HDLC SERPL-MCNC: 51 MG/DL
HGB BLD-MCNC: 14 G/DL (ref 11.7–15.7)
LDLC SERPL CALC-MCNC: 50 MG/DL
MCH RBC QN AUTO: 29.4 PG (ref 26.5–33)
MCHC RBC AUTO-ENTMCNC: 33.9 G/DL (ref 31.5–36.5)
MCV RBC AUTO: 87 FL (ref 78–100)
NONHDLC SERPL-MCNC: 80 MG/DL
PLATELET # BLD AUTO: 223 10E3/UL (ref 150–450)
PROT SERPL-MCNC: 7.1 G/DL (ref 6.4–8.3)
RBC # BLD AUTO: 4.76 10E6/UL (ref 3.8–5.2)
TRIGL SERPL-MCNC: 152 MG/DL
TSH SERPL DL<=0.005 MIU/L-ACNC: 2.79 UIU/ML (ref 0.3–4.2)
WBC # BLD AUTO: 5.9 10E3/UL (ref 4–11)

## 2024-12-12 RX ORDER — HYDROCHLOROTHIAZIDE 25 MG/1
25 TABLET ORAL DAILY
Qty: 90 TABLET | Refills: 3 | Status: SHIPPED | OUTPATIENT
Start: 2024-12-12

## 2024-12-12 SDOH — HEALTH STABILITY: PHYSICAL HEALTH: ON AVERAGE, HOW MANY DAYS PER WEEK DO YOU ENGAGE IN MODERATE TO STRENUOUS EXERCISE (LIKE A BRISK WALK)?: 5 DAYS

## 2024-12-12 ASSESSMENT — PAIN SCALES - GENERAL: PAINLEVEL_OUTOF10: NO PAIN (0)

## 2024-12-12 ASSESSMENT — SOCIAL DETERMINANTS OF HEALTH (SDOH): HOW OFTEN DO YOU GET TOGETHER WITH FRIENDS OR RELATIVES?: TWICE A WEEK

## 2024-12-12 NOTE — PROGRESS NOTES
"Preventive Care Visit  [facility]  NORRIS Brody CNP, Family Medicine  Dec 12, 2024      Assessment & Plan     Encounter for Medicare annual wellness exam  Recommend consuming a healthy diet and exercising.  Influenza and COVID vaccines provided.  Other vaccines declined today.  She declines hepatitis C screening.  She declines bone density scan.  She is up-to-date on breast cancer and colorectal cancer screening.  - CBC with platelets  - TSH with free T4 reflex  - CBC with platelets  - TSH with free T4 reflex    Diabetes mellitus screening  - Hemoglobin A1c  - Hemoglobin A1c      Mixed hyperlipidemia  Patient continues atorvastatin 20 mg daily.  She plans on completing the prescription and then will discontinue this for 1 to 2 months to see if the leg cramps improved.  Will obtain coronary calcium score for risk stratification to determine if statin is necessary.  - Lipid panel reflex to direct LDL Fasting  - CT Coronary Calcium Scan  - Lipid panel reflex to direct LDL Fasting    Class 2 severe obesity with serious comorbidity and body mass index (BMI) of 36.0 to 36.9 in adult, unspecified obesity type (H)  Recommend consuming a healthy diet and exercising.  This is contributing to hyperlipidemia.    Edema, unspecified type  She continues hydrochlorothiazide.    Medication management  - Hepatic panel (Albumin, ALT, AST, Bili, Alk Phos, TP)  - Hepatic panel (Albumin, ALT, AST, Bili, Alk Phos, TP)          BMI  Estimated body mass index is 36.42 kg/m  as calculated from the following:    Height as of this encounter: 1.715 m (5' 7.5\").    Weight as of this encounter: 107 kg (236 lb).   Weight management plan: Discussed healthy diet and exercise guidelines    Counseling  Appropriate preventive services were addressed with this patient via screening, questionnaire, or discussion as appropriate for fall prevention, nutrition, physical activity, Tobacco-use cessation, social engagement, weight " loss and cognition.  Checklist reviewing preventive services available has been given to the patient.  Reviewed patient's diet, addressing concerns and/or questions.   I have reviewed Opioid Use Disorder and Substance Use Disorder risk factors and made any needed referrals.         Yolanda Vale is a 69 year old, presenting for the following:  Wellness Visit        12/12/2024     9:23 AM   Additional Questions   Roomed by Kiera         Via the Health Maintenance questionnaire, the patient has reported the following services have been completed -Mammogram: [facility] 2024-09-05, this information has been sent to the abstraction team.    HPI    She is .  She has a daughter who is 49 years old.  She has two grandchildren. She has two dogs.  She is working for [employer]. She is consuming a healthy diet.  She walks and cares for her yard for exercise. Patient has hypertension and lower extremity edema.  She is taking hydrochlorothiazide 25 mg daily.   She denies headaches, blurry vision, chest pain, shortness of breath with exertion, orthopnea, syncope.    She takes atorvastatin 20 mg daily for lipid management.  She complains of lower extremity cramps which have worsened.  She believes this is related to taking the atorvastatin.  She has started a magnesium supplement which has improved the cramps.     Health Care Directive  Patient does not have a Health Care Directive: Discussed advance care planning with patient; information given to patient to review.      12/12/2024   General Health   How would you rate your overall physical health? Excellent   Feel stress (tense, anxious, or unable to sleep) Only a little      (!) STRESS CONCERN      12/12/2024   Nutrition   Diet: I don't know            12/12/2024   Exercise   Days per week of moderate/strenous exercise 5 days            12/12/2024   Social Factors   Frequency of gathering with friends or relatives Twice a week   Worry food won't last  until get money to buy more No   Food not last or not have enough money for food? No   Do you have housing? (Housing is defined as stable permanent housing and does not include staying ouside in a car, in a tent, in an abandoned building, in an overnight shelter, or couch-surfing.) Yes   Are you worried about losing your housing? No   Lack of transportation? No   Unable to get utilities (heat,electricity)? No            12/12/2024   Fall Risk   Fallen 2 or more times in the past year? No     No    Trouble with walking or balance? No     No        Patient-reported    Multiple values from one day are sorted in reverse-chronological order          12/12/2024   Activities of Daily Living- Home Safety   Needs help with the following daily activites None of the above   Safety concerns in the home None of the above            12/12/2024   Dental   Dentist two times every year? Yes            12/12/2024   Hearing Screening   Hearing concerns? None of the above            12/12/2024   Driving Risk Screening   Patient/family members have concerns about driving No            12/12/2024   General Alertness/Fatigue Screening   Have you been more tired than usual lately? No            12/12/2024   Urinary Incontinence Screening   Bothered by leaking urine in past 6 months No            12/12/2024   TB Screening   Were you born outside of the [place]? No            Today's PHQ-2 Score:       12/12/2024     9:20 AM   PHQ-2 ( 1999 Pf[employer])   Q1: Little interest or pleasure in doing things 0    Q2: Feeling down, depressed or hopeless 0    PHQ-2 Score 0    Q1: Little interest or pleasure in doing things Not at all   Q2: Feeling down, depressed or hopeless Not at all   PHQ-2 Score 0       Patient-reported           12/12/2024   Substance Use   Alcohol more than 3/day or more than 7/wk No   Do you have a current opioid prescription? (!) YES   How severe/bad is pain from 1 to 10? 4/10   Do you use any other substances recreationally? No              No data to display              Low Risk (0-3)  Moderate Risk (4-7)  High Risk (>8)  Social History     Tobacco Use    Smoking status: Never     Passive exposure: Never    Smokeless tobacco: Never   Vaping Use    Vaping status: Never Used   Substance Use Topics    Alcohol use: Yes     Comment: Alcoholic Drinks/day: rare    Drug use: No          Mammogram Screening - Mammogram every 1-2 years updated in Health Maintenance based on mutual decision making      History of abnormal Pap smear: Status post hysterectomy with removal of cervix and no history of CIN2 or greater or cervical cancer. Health Maintenance and Surgical History updated.       ASCVD Risk   The 10-year ASCVD risk score (Joana GÓMEZ, et al., 2019) is: 10.6%    Values used to calculate the score:      Age: 69 years      Sex: Female      Is Non- : No      Diabetic: No      Tobacco smoker: No      Systolic Blood Pressure: 129 mmHg      Is BP treated: Yes      HDL Cholesterol: 44 mg/dL      Total Cholesterol: 135 mg/dL            Reviewed and updated as needed this visit by Provider   Tobacco     Med Hx  Surg Hx  Fam Hx            History reviewed. No pertinent past medical history.  Past Surgical History:   Procedure Laterality Date    BIOPSY CERVICAL, LOCAL EXCISION, SINGLE/MULTIPLE      HYSTERECTOMY      HYSTERECTOMY TOTAL ABDOMINAL      TOTAL KNEE ARTHROPLASTY Bilateral      Current Outpatient Medications   Medication Sig Dispense Refill    ascorbic acid, vitamin C, (VITAMIN C) 500 MG tablet [ASCORBIC ACID, VITAMIN C, (VITAMIN C) 500 MG TABLET] Take 500 mg by mouth.      aspirin-acetaminophen-caffeine (EXCEDRIN MIGRAINE) 250-250-65 mg per tablet [ASPIRIN-ACETAMINOPHEN-CAFFEINE (EXCEDRIN MIGRAINE) 250-250-65 MG PER TABLET] Take 2 tablets by mouth daily.      atorvastatin (LIPITOR) 20 MG tablet TAKE 1 TABLET AT BEDTIME 90 tablet 3    calcium carbonate-vitamin D 600-200 MG-UNIT CAPS 2 tabs daily 1000mg       calcium carbonate-vitamin D3 600 mg calcium- 200 unit cap [CALCIUM CARBONATE-VITAMIN D3 600 MG CALCIUM- 200 UNIT CAP] Take 1 tablet by mouth daily.      cholecalciferol 50 MCG (2000 UT) CAPS Take 2,000 Units by mouth      hydrochlorothiazide (HYDRODIURIL) 25 MG tablet Take 1 tablet (25 mg) by mouth daily 90 tablet 3    ibuprofen (ADVIL/MOTRIN) 200 MG tablet 2 tabs every am      triamcinolone (KENALOG) 0.1 % cream [TRIAMCINOLONE (KENALOG) 0.1 % CREAM] Apply to the affected area twice daily as needed.  No longer than 2 weeks in duration. 240 g 3     Current providers sharing in care for this patient include:  Patient Care Team:  Celia Falcon APRN CNP as PCP - General (Family Practice)  Celia Falcon APRN CNP as Assigned PCP    The following health maintenance items are reviewed in Epic and correct as of today:  Health Maintenance   Topic Date Due    DEXA  Never done    HEPATITIS C SCREENING  Never done    HEPATITIS A IMMUNIZATION (1 of 2 - Risk 2-dose series) Never done    HEPATITIS B IMMUNIZATION (1 of 3 - Risk 3-dose series) Never done    RSV VACCINE (1 - Risk 60-74 years 1-dose series) Never done    MAMMO SCREENING  08/15/2021    MEDICARE ANNUAL WELLNESS VISIT  09/02/2024    LIPID  11/17/2024    BMP  02/05/2025    DTAP/TDAP/TD IMMUNIZATION (4 - Td or Tdap) 10/30/2025    ANNUAL REVIEW OF HM ORDERS  12/12/2025    FALL RISK ASSESSMENT  12/12/2025    GLUCOSE  02/05/2027    ADVANCE CARE PLANNING  12/12/2029    COLORECTAL CANCER SCREENING  10/07/2031    PHQ-2 (once per calendar year)  Completed    INFLUENZA VACCINE  Completed    Pneumococcal Vaccine: 65+ Years  Completed    ZOSTER IMMUNIZATION  Completed    COVID-19 Vaccine  Completed    HPV IMMUNIZATION  Aged Out    MENINGITIS IMMUNIZATION  Aged Out    RSV MONOCLONAL ANTIBODY  Aged Out         Review of Systems  Constitutional, HEENT, cardiovascular, pulmonary, GI, , musculoskeletal, neuro, skin, endocrine and psych systems are negative, except as otherwise  "noted.     Objective    Exam  /79   Pulse 53   Temp 97.5  F (36.4  C)   Resp 14   Ht 1.715 m (5' 7.5\")   Wt 107 kg (236 lb)   SpO2 99%   Breastfeeding No   BMI 36.42 kg/m     Estimated body mass index is 36.42 kg/m  as calculated from the following:    Height as of this encounter: 1.715 m (5' 7.5\").    Weight as of this encounter: 107 kg (236 lb).    Physical Exam  GENERAL: alert and no distress  EYES: Eyes grossly normal to inspection, PERRL and conjunctivae and sclerae normal  HENT: ear canals and TM's normal, nose and mouth without ulcers or lesions  NECK: no adenopathy, no asymmetry, masses, or scars  RESP: lungs clear to auscultation - no rales, rhonchi or wheezes  CV: regular rate and rhythm, normal S1 S2, no S3 or S4, no murmur, click or rub, no peripheral edema  ABDOMEN: soft, nontender, no hepatosplenomegaly, no masses and bowel sounds normal  MS: no gross musculoskeletal defects noted, no edema  SKIN: no suspicious lesions or rashes  NEURO: Normal strength and tone, mentation intact and speech normal  PSYCH: mentation appears normal, affect normal/bright        12/12/2024   Mini Cog   Clock Draw Score 2 Normal   3 Item Recall 2 objects recalled   Mini Cog Total Score 4                 Signed Electronically by: NORRIS Brody CNP    "

## 2024-12-16 ENCOUNTER — TELEPHONE (OUTPATIENT)
Dept: FAMILY MEDICINE | Facility: CLINIC | Age: 69
End: 2024-12-16
Payer: COMMERCIAL

## 2024-12-16 NOTE — TELEPHONE ENCOUNTER
Patient calling to inform that her insurance requires a prior authorization prior to CT Coronary Calcium Score, order placed by PCP on 12/12. Patient states her insurance needs a procedure code to know if it will be covered.    Confirmed with imaging that a PA is automatically started and is currently pending review, per referral. Advised she should should not need procedure code, but did provide in case it is needed.     Patient requested letter with lab results from 12/12/2024.  Address confirmed and results mailed.    Re Dennison RN  [facility]

## 2024-12-16 NOTE — LETTER
December 16, 2024      Akanksha RUTH ANN Cohn  [address]  [place] [address]        Dear ,    Attached are your lab results, as requested. I have also included the notes from NORRIS Roach, CNP.    Your complete blood count results were normal.  No evidence for anemia, etc.   Your triglycerides are elevated.  This can be due to not completely fasting prior to the lab draw.  I recommend you consume a healthy low-fat diet (avoid fast food, fried foods, processed foods, and butter) and exercise.  Avoiding or limiting alcohol will assist with this.   Your thyroid screening test (i.e. TSH) was normal.   Your A1C (average blood sugar over 3 months) places you in the prediabetes range.  This means that if you continue on your path, you may develop diabetes in the future.  I recommend you consume a healthy diet (avoid white breads, refined carbohydrates, sweets) and exercise.   [facility] offers prediabetes classes if you are interested.   Liver enzymes are normal.     If you have any questions or concerns, please call the clinic at the number listed above.       Sincerely,    Re Dennison RN  [facility]

## 2025-02-20 DIAGNOSIS — E78.2 MIXED HYPERLIPIDEMIA: Primary | ICD-10-CM

## 2025-04-14 ENCOUNTER — HOSPITAL ENCOUNTER (OUTPATIENT)
Dept: CT IMAGING | Facility: CLINIC | Age: 70
Discharge: HOME | End: 2025-04-14
Attending: NURSE PRACTITIONER
Payer: COMMERCIAL

## 2025-04-14 ENCOUNTER — HOSPITAL ENCOUNTER (OUTPATIENT)
Dept: ULTRASOUND IMAGING | Facility: CLINIC | Age: 70
Discharge: HOME | End: 2025-04-14
Attending: NURSE PRACTITIONER
Payer: COMMERCIAL

## 2025-04-14 DIAGNOSIS — M79.89 SOFT TISSUE MASS: ICD-10-CM

## 2025-04-14 DIAGNOSIS — E78.2 MIXED HYPERLIPIDEMIA: ICD-10-CM

## 2025-04-14 LAB
CV CALCIUM SCORE AGATSTON LM: 0
CV CALCIUM SCORING AGATSON LAD: 0
CV CALCIUM SCORING AGATSTON CX: 0
CV CALCIUM SCORING AGATSTON RCA: 0
CV CALCIUM SCORING AGATSTON TOTAL: 0

## 2025-04-14 PROCEDURE — 75571 CT HRT W/O DYE W/CA TEST: CPT

## 2025-04-14 PROCEDURE — 75571 CT HRT W/O DYE W/CA TEST: CPT | Mod: 26 | Performed by: GENERAL ACUTE CARE HOSPITAL

## 2025-04-14 PROCEDURE — 76536 US EXAM OF HEAD AND NECK: CPT

## 2025-04-17 DIAGNOSIS — R91.8 PULMONARY NODULES: Primary | ICD-10-CM

## 2025-06-11 ENCOUNTER — MYC MEDICAL ADVICE (OUTPATIENT)
Dept: FAMILY MEDICINE | Facility: CLINIC | Age: 70
End: 2025-06-11
Payer: COMMERCIAL

## 2025-06-11 DIAGNOSIS — E78.2 MIXED HYPERLIPIDEMIA: ICD-10-CM

## 2025-06-11 RX ORDER — ATORVASTATIN CALCIUM 20 MG/1
20 TABLET, FILM COATED ORAL AT BEDTIME
Qty: 100 TABLET | Refills: 2 | Status: SHIPPED | OUTPATIENT
Start: 2025-06-11 | End: 2025-06-20

## 2025-06-11 NOTE — TELEPHONE ENCOUNTER
Sent [employer] message to Akanksha Cohn in regards to their atorvastatin being overdue for refill. Per our records last filled 2/21/25 for 90 day supply and is 20 days late to refill.      Alissa Aldana, PharmD, [facility]  Population Health Pharmacist  [phone]

## 2025-06-11 NOTE — TELEPHONE ENCOUNTER
Patient has [facility] coverage and with this insurance plan, the patient is eligible to receive certain prescriptions as a 100-day supply at the 90-day supply cost.      Prescriptions updated to 100-day supply per protocol: Dexter AcD, [facility]  Population Health Pharmacist  [phone]

## 2025-06-24 RX ORDER — ATORVASTATIN CALCIUM 20 MG/1
20 TABLET, FILM COATED ORAL AT BEDTIME
Qty: 100 TABLET | Refills: 2 | Status: SHIPPED | OUTPATIENT
Start: 2025-06-24